# Patient Record
Sex: MALE | Race: WHITE
[De-identification: names, ages, dates, MRNs, and addresses within clinical notes are randomized per-mention and may not be internally consistent; named-entity substitution may affect disease eponyms.]

---

## 2022-12-09 NOTE — NUR
RN NOTE



TELEPHONE CALL FROM JANEEN OF [facility], VERIFIED IF FAMILY STILL 
WANTS TO BE TRANSFERRED, ADVISED HER YES PER NEERU, PT'S DAUGHTER. JANEEN SAID TRANSFER MIGHT 
NOT HAPPEN TONIGHT, BUT OBTAINED PRIMARY MD'S CONTACT FOR PEER TO PEER.

## 2022-12-09 NOTE — NUR
RT NOTE

LATE ENTRY

Pt rec'd trached on [facility] vent on AC mode settings given from fire dept. Pt shows no signs of 
resp distress or sob. Pt sx'd for thick mod amt of pale yellow secretions. Alarms are set 
and audible. Ambu bag at bedside. Vent plugged into red outlet.

-------------------------------------------------------------------------------

Addendum: 12/09/22 at 0622 by ADAN HAWKINS RT

-------------------------------------------------------------------------------

Amended: Links added.

## 2022-12-09 NOTE — NUR
ADMISSION RN NOTES



PATIENT RECEIVED VIA GURNEY FROM ED, ALERT BUT NON VERBAL, CAN ONLY UNDERSTAND Citizen of [place]. ON 
VENT SETTING, TOLERATING WELL, FAMILY AT THE BEDSIDE TO TRANSLATE. DENIES ANY PAIN AT THIS 
MOMENT, NOTED WHEN CLEANING THE PATIENT, PATIENT TRIED PULLING OUT TRACH AND GT, ORDERED 
BILATERAL WRIST SOFT RESTRAINT, EXPLAINED TO THE FAMILY AT THE BEDSIDE, CONFIRMED 
UNDERSTANDING. GT NOTED PATENT AND INTACT. NOTED LEFT WRIST NOTED PATENT AND INTACT, FLUSHES 
WELL. MIDLINE INSERTED AT YUVAL, OK TO USED PER MIDLINE NURSE, FLUSHES WELL. DUE LASIX GIVEN.  
NOTED BLOOD SUGAR 247 MG/DL. NOTED WITH F/C PATENT AND INTACT, DRAINING CLEAR YELLOW URINE. 
NOTED OPEN WOUND ON PENIS AREA, SACRUM, UPPER BACK AND LEFT LOWER LEG, WOUND CARE CONSULT 
PLACED, COVERED WITH MEPILEX. NOTED WITH BLE +2 PITTING EDEMA AND SCROTAL EDEMA. ORIENTED TO 
THE USE OF CALL LIGHT. BED IN LOWEST POSITION. SAFETY MEASURES IN PLACED. ENDORSED TO THAO JOSEPH FOR ISAMAR.

## 2022-12-09 NOTE — NUR
NILESH FROM [facility] C/O HYPERGLYCEMIA BS  TAKEN 20 MINS AGO. PT 
AWAKE AND RESPONSIVE. TRACH DEPENDANT; RT AT PT'S BEDSIDE. VENT SETTINGS AC VT: 
FIO2 35, VT 45, PEEP +5. GTUBE PEG INTACT. F/C DRAINING URINE. CONNECTED PT TO 
POX AND MONITOR. SAFETY MEASURES IN PLACE.

## 2022-12-09 NOTE — NUR
RT AT BEDSIDE; INFORMED RN THAT PT'S RESP RATE IS AT 35-40. DR KELLEY MADE AWARE 
W/ ORDER OF ATIVAN 1MG, ORDER IS READ BACK AND VERIFIED.

## 2022-12-09 NOTE — NUR
YUNI FROM [facility] [phone] HAVE NO BED AVAILABLE BUT 
WILL CONTACT US WHEN ONE IS READY.

## 2022-12-09 NOTE — NUR
RN NOTE



PATIENT IN BED, AO X 2-3, NON VERBAL, UNDERSTANDS [place] ONLY, FAMILY AT BEDSIDE. PT IN NO 
ACUTE DISTRESS, ON TRACH TO MECHANICAL VENT WITH PRESCRIBED SETTINGS, SATURATION AT 97%, SR 
ON THE MONITOR, HR IS 97. YUVAL MIDLINE PATENT AND FLUSHING WELL. GTUBE IN PLACE, CLAMPED, 
BARCENAS CATHETER DRAINING TO A CLEAR, YELLOW OUTPUT. SAFETY MEASURES IN PLACE, BED IS LOCKED 
AND AT LOWEST POSITION, HOB ELEVATED, CALL LIGHT WITHIN REACH OF PATIENT. WILL CONT TO 
MONITOR AND REASSESS.

## 2022-12-10 NOTE — NUR
RN NOTE



PATIENT IN BED, AO X 2-3, NON VERBAL, UNDERSTANDS Gambian ONLY, FAMILY AT BEDSIDE. PT IN NO 
ACUTE DISTRESS, ON TRACH TO MECHANICAL VENT WITH PRESCRIBED SETTINGS, SATURATION %, SR 
ON THE MONITOR, HR IS 95. YUVAL MIDLINE PATENT AND FLUSHING WELL. GTUBE IN PLACE, NO RESIDUAL, 
RESUMED TUBE FEEDING OF GLUCERNA AT 35 ML/HR. BARCENAS CATHETER DRAINING TO A CLEAR, YELLOW 
OUTPUT. SAFETY MEASURES IN PLACE, BED IS LOCKED AND AT LOWEST POSITION, HOB ELEVATED, CALL 
LIGHT WITHIN REACH OF PATIENT. WILL CONT TO MONITOR AND REASSESS.

## 2022-12-10 NOTE — NUR
RN NOTE



FAMILY REQUESTING FOR MEDS TO CALM PATIENT DOWN AND HELP HIM SLEEP AT NIGHT AS PATIENT GETS 
RESTLESS AND TRIES TO PULL OUT TRACH AND GTUBE. DR DE LEON NOTIFIED, ORDER RECEIVED FOR SEROQUEL 
25 MG HS.

## 2022-12-11 NOTE — NUR
RN NOTE



PATIENT IN BED, AO X 2-3, NON VERBAL, UNDERSTANDS [place] . PT IN NO ACUTE DISTRESS, ON 
TRACH TO MECHANICAL VENT WITH PRESCRIBED SETTINGS, SATURATION %, SR ON THE MONITOR, HR 
IS 95. YUVAL MIDLINE PATENT AND FLUSHING WELL. GTUBE IN PLACE, NO RESIDUAL, RESUMED TUBE 
FEEDING OF GLUCERNA AT 35 ML/HR. BARCENAS CATHETER DRAINING TO A CLEAR, YELLOW OUTPUT. SAFETY 
MEASURES IN PLACE, BED IS LOCKED AND AT LOWEST POSITION, HOB ELEVATED, CALL LIGHT WITHIN 
REACH OF PATIENT. WILL CONT TO MONITOR AND REASSESS.

## 2022-12-11 NOTE — NUR
TELE RN OPENING NOTE

PT RECEIVED IN BED, AWAKE, NON-VERBAL, BUT ABLE TO MOUTH WORDS; MAINLY [place]-SPEAKING. PT 
ON TRACH WITH SHILEY #8, AC 20, , FIO2 35%, PEEP 5; WITH CURRENT O2SAT %; NO S/S 
OF RESP DISTRESS, NO SOB OR COUGH, NON-LABORED AND EQUAL BREATHING. PT ATTACHED TO EXTERNAL 
MONITOR, SR WITH HR OF 83. BARCENAS INTACT AND PATENT, DRAINING CLEAR AND YELLOW URINE. PT 
NOTED TO HAVE BILATERAL SOFT WRIST RESTRAINTS; NO SIGNS OF IMPAIRED SKIN OR CIRCULATION; 
WILL PROVIDE PT WITH RELEASE OF RESTRAINTS AND HYGIENE. YUVAL MIDLINE INTACT AND PATENT, 
FLUSHES EASILY WITH NO RESISTANCE, NO MEDS/FLUIDS INFUSING THROUGH IT. BED IN LOWEST 
POSITION, CALL LIGHT WITHIN REACH, SIDE RAILS UP X3. WILL CONTINUE TO MONITOR THROUGHOUT THE 
NIGHT.

## 2022-12-11 NOTE — NUR
RN NOTE



PATIENT IN BED, AO X 2-3, NON VERBAL,  . PT IN NO ACUTE DISTRESS, ON TRACH TO MECHANICAL 
VENT WITH PRESCRIBED SETTINGS, SATURATION %, SR ON THE MONITOR, HR IS 95. YUVAL MIDLINE 
PATENT AND FLUSHING WELL. GTUBE IN PLACE, NO RESIDUAL, TUBE FEEDING HELD AT 1700. BARCENAS 
CATHETER DRAINING TO A CLEAR, YELLOW OUTPUT. SAFETY MEASURES IN PLACE, BED IS LOCKED AND AT 
LOWEST POSITION, HOB ELEVATED, CALL LIGHT WITHIN REACH OF PATIENT.ENDORSED TO NIGHT SHIFT RN 
FOR ISAMAR

## 2022-12-12 NOTE — NUR
RN NOTE



PATIENT IN BED, AO X 2-3, MOUTH WORD,  . PT IN NO ACUTE DISTRESS, ON TRACH TO MECHANICAL 
VENT WITH PRESCRIBED SETTINGS, SATURATION %, SR ON THE MONITOR, YUVAL MIDLINE PATENT AND 
FLUSHING WELL. GTUBE IN PLACE, NO RESIDUAL, GT GLUCERNA RUNNING 55 ML/HR CONTINUOUS. BARCENAS 
CATHETER DRAINING TO A CLEAR, YELLOW OUTPUT. SAFETY MEASURES IN PLACE, BED IS LOCKED AND AT 
LOWEST POSITION, HOB ELEVATED, CALL LIGHT WITHIN REACH OF PATIENT.CONTINUE BILATERAL SOFT 
RESTRAIN, SKIN AND CIRCULATION CHECKED WITHIN NORMAL. ENDORSED TO NIGHT SHIFT RN FOR ISAMAR

## 2022-12-12 NOTE — NUR
WOUND CARE CONSULT: PT PRESENTS WITH MULTIPLE WOUNDS INCLUDING PENIS, RT GROIN CLOSED 
INCISION WITH SOME WEEPING, SACRAL AND BACK UNSTAGEABLE PRESSURE ULCERS AND LEFT LOWER LEG 
DRY WOUND, ALL PRESENT ON ADMISSION.DR ARAGON CALLED FOR SURGICAL CONSULT. 
RECOMMENDATIONS MADE FOR SKIN PROTECTION AND DISCUSSED WITH NURSING STAFF. MD IN AGREEMENT 
WITH PLAN OF CARE.

## 2022-12-12 NOTE — NUR
TELE RN CLOSING NOTE

PT REMAINS IN BED, ASLEEP BUT EASILY AROUSABLE, NON-VERBAL, BUT ABLE TO MOUTH WORDS AND NOD 
HEAD TO YES OR NO QUESTIONS; MAINLY Greenlandic-SPEAKING. PT REMAINS ON SAME VENT SETTINGS; 
TOLERATED VENT SETTINGS WELL WITH O2SAT RANGING FROM 97%- 100%; NO S/S OF RESP DISTRESS, NO 
SOB OR COUGH, NON-LABORED AND EQUAL BREATHING. PT ATTACHED TO EXTERNAL MONITOR, SR WITH HR 
RANGING FROM 81- 83. BARCENAS INTACT AND PATENT, DRAINING CLEAR AND YELLOW URINE. BILATERAL 
SOFT WRIST RESTRAINTS REMAIN IN PLACE; NO SIGNS OF IMPAIRED SKIN OR CIRCULATION; PROVIDED PT 
WITH RELEASE OF RESTRAINTS AND HYGIENE. YUVAL MIDLINE INTACT AND PATENT, FLUSHES EASILY WITH 
NO RESISTANCE, NO MEDS/FLUIDS INFUSING THROUGH IT. ALL DUE MEDS ADMINISTERED DURING THE 
NIGHT. BED IN LOWEST POSITION, CALL LIGHT WITHIN REACH, SIDE RAILS UP X3. WILL ENDORSE TO 
DAYSHIFT NURSE TO CONTINUE CARE.

## 2022-12-12 NOTE — NUR
TELE RN OPENING NOTE

PT RECEIVED IN BED, AWAKE, NON-VERBAL, BUT ABLE TO MOUTH WORDS; MAINLY French-SPEAKING. PT 
ON TRACH WITH SHILEY #8, AC 20, , FIO2 35%, PEEP 5; WITH CURRENT O2SAT %; NO S/S 
OF RESP DISTRESS, NO SOB OR COUGH, NON-LABORED AND EQUAL BREATHING. PT ATTACHED TO EXTERNAL 
MONITOR, SR WITH HR OF 77 . BARCENAS INTACT AND PATENT, DRAINING CLEAR AND YELLOW URINE. PT 
NOTED TO HAVE BILATERAL SOFT WRIST RESTRAINTS; NO SIGNS OF IMPAIRED SKIN OR CIRCULATION; 
WILL PROVIDE PT WITH RELEASE OF RESTRAINTS AND HYGIENE. YUVAL MIDLINE INTACT AND PATENT, 
FLUSHES EASILY WITH NO RESISTANCE,. BED IN LOWEST POSITION, CALL LIGHT WITHIN REACH, SIDE 
RAILS UP X3. WILL CONTINUE TO MONITOR THROUGHOUT

## 2022-12-12 NOTE — NUR
RN TELE OPEN NOTE: ALERT AND ORIENTED TO NAME. REORIENTED TO TIME AND PLACE. MOUTHS WORDS. 
TRACH WITH VENT AT PRESCRIBED SETTINGS. GT IN PLACE PATENT. LEFT UPPER ARM MIDLINE IN PLACE. 
NO S/S OF COMPLICATIONS. ON TELE MONITOR WITH A READING OF SINUS RHYTHM.  BILATERAL SOFT 
WRIST RESTRAINTS IN PLACE SKIN IS WITHIN NORMAL, CIRCULATION WITHIN NORMAL. WOUNDS WITH 
CLEAN DRESSINGS ON. NO S/S OF COMPLICATIONS.  HOB ELEVATED SEMI FOWLERS POSITION, BILATERAL 
HALF SIDE RAILS UPX2. BED IS LOCKED, IN LOW POSITION, EXIT ALARM ON. CALL LIGHT IN REACH.

## 2022-12-13 NOTE — NUR
RN NOTE 



INFORMED DR ASENCIO REGARDING PLATELET COUNT 23. RECEIVED ORDER FOR 1 PLATELET TRANSFUSION. 
ORDERS PLACED

## 2022-12-13 NOTE — NUR
RN OPENING NOTE: 



ALERT AND ORIENTED TO NAME. REORIENTED TO TIME AND PLACE. MOUTHS WORDS. TRACH WITH VENT AT 
PRESCRIBED SETTINGS. GT IN PLACE PATENT. LEFT UPPER ARM MIDLINE IN PLACE. NO S/S OF 
COMPLICATIONS. ON TELE MONITOR.  BILATERAL SOFT WRIST RESTRAINTS IN PLACE SKIN IS WITHIN 
NORMAL, CIRCULATION WITHIN NORMAL. WOUNDS WITH CLEAN DRESSINGS ON. NO S/S OF COMPLICATIONS.  
HOB ELEVATED SEMI FOWLERS POSITION, BILATERAL HALF SIDE RAILS UPX2. BED IS LOCKED, IN LOW 
POSITION, EXIT ALARM ON.

## 2022-12-13 NOTE — NUR
RN TELE CLOSING NOTE: ALERT AND ORIENTED TO NAME. REORIENTED TO TIME AND PLACE. MOUTHS 
WORDS. TRACH WITH VENT AT PRESCRIBED SETTINGS. GT IN PLACE PATENT. LEFT UPPER ARM MIDLINE IN 
PLACE. NO S/S OF COMPLICATIONS. ON TELE MONITOR WITH A READING OF SINUS RHYTHM.  BILATERAL 
SOFT WRIST RESTRAINTS IN PLACE SKIN IS WITHIN NORMAL, CIRCULATION WITHIN NORMAL. WOUNDS WITH 
CLEAN DRESSINGS ON. NO S/S OF COMPLICATIONS.  HOB ELEVATED SEMI FOWLERS POSITION, BILATERAL 
HALF SIDE RAILS UPX2. BED IS LOCKED, IN LOW POSITION, EXIT ALARM ON. CALL LIGHT IN REACH. 
DENIES PAIN OR DISCOMFORT.

## 2022-12-13 NOTE — NUR
RN NOTE 



RECEIVED CRITICAL 2.8 POTASSIUM INFORMED DR OCHOA RECEIVED ORDER FOR 20MEQ POTASSIUM IV. 
ORDERS PLACED

## 2022-12-13 NOTE — NUR
TELE RN OPENING NOTE: 



RECEIVED PT ALERT AND ORIENTED X1. MOUTHS WORDS. TRACH WITH VENT AT PRESCRIBED SETTINGS. GT 
IN PLACE PATENT. LEFT UPPER ARM MIDLINE IN PLACE. NO S/S OF COMPLICATIONS. ON TELE MONITOR.  
BILATERAL SOFT WRIST RESTRAINTS IN PLACE SKIN IS WITHIN NORMAL, CIRCULATION WITHIN NORMAL. 
WOUNDS WITH CLEAN DRESSINGS ON. NO S/S OF COMPLICATIONS.  HOB ELEVATED SEMI FOWLERS 
POSITION, BILATERAL HALF SIDE RAILS UPX2. BED IS LOCKED, IN LOW POSITION, BED ALARM ON. WILL 
CONTINUE TO MONITOR THROUGHOUT THE SHIFT.

## 2022-12-13 NOTE — NUR
RN TELE CLOSING NOTE:

 

ALERT AND ORIENTED TO NAME. MOUTHS WORDS. TRACH WITH VENT AT PRESCRIBED SETTINGS. GT IN 
PLACE PATENT. LEFT UPPER ARM MIDLINE IN PLACE. NO S/S OF COMPLICATIONS. ON TELE MONITOR WITH 
A READING OF SINUS RHYTHM.  BILATERAL SOFT WRIST RESTRAINTS IN PLACE SKIN IS WITHIN NORMAL, 
CIRCULATION WITHIN NORMAL. WOUNDS WITH CLEAN DRESSINGS ON. NO S/S OF COMPLICATIONS.  HOB 
ELEVATED SEMI FOWLERS POSITION, BILATERAL HALF SIDE RAILS UPX2. BED IS LOCKED, IN LOW 
POSITION, EXIT ALARM ON. CALL LIGHT IN REACH. DENIES PAIN OR DISCOMFORT.

## 2022-12-14 NOTE — NUR
TELE RN OPENING NOTE: 



RECEIVED PT IN BED, ALERT AND ORIENTED TO NAME. REORIENTED TO TIME AND PLACE. MOUTHS WORDS. 
TRACH WITH VENT AT PRESCRIBED SETTINGS, TOLERATING WELL.  GT IN PLACE, CLAMPED. WILL RESUME 
AT 2100.  LEFT UPPER ARM MIDLINE IN PLACE. PATENT AND INTACT, RUNNING NS TKO.  NO S/SX OF 
COMPLICATIONS. ON TELE MONITOR READS SR WITH HR.  BILATERAL SOFT WRIST RESTRAINTS IN PLACE, 
SKIN IS WITHIN NORMAL, CIRCULATION WITHIN NORMAL. ALL SAFETY MEASURES IN PLACE: HOB ELEVATED 
SEMI FOWLERS POSITION, BILATERAL HALF SIDE RAILS UPX2 WITH EXTRA PADDING IN PLACE. BED IS 
LOCKED, IN LOW POSITION, EXIT ALARM ON. CALL LIGHT WITHIN REACH. WILL CONTINUE TO MONITOR.

## 2022-12-14 NOTE — NUR
TELE RN CLOSING NOTE: 



PT AWAKE, ALERT AND ORIENTED X1. MOUTHS WORDS. TRACH WITH VENT AT PRESCRIBED SETTINGS. GT IN 
PLACE, HOLD FOR NOW DUE TO NPO STATUS FOR SURGERY. LEFT UPPER ARM MIDLINE IN PLACE INTACT 
AND PATENT WITH NS AT TKO. NO S/S OF COMPLICATIONS. ON TELE MONITOR READING SR AT 82.  
BILATERAL SOFT WRIST RESTRAINTS IN PLACE SKIN IS WITHIN NORMAL, CIRCULATION WITHIN NORMAL. 
WOUNDS WITH CLEAN DRESSINGS ON. NO S/S OF COMPLICATIONS.  HOB ELEVATED SEMI FOWLERS 
POSITION, BILATERAL HALF SIDE RAILS UPX2. BED IS LOCKED, IN LOW POSITION, BED ALARM ON. WILL 
ENDORSE TO AM SHIFT NURSE FOR CONTINUITY OF CARE.

## 2022-12-15 NOTE — NUR
TELE RN CLOSING NOTES



ALERT AND ORIENTED TO NAME. REORIENTED TO TIME AND PLACE. MOUTHS WORDS. TRACH WITH VENT AT 
PRESCRIBED SETTINGS, NO SOB NOTED, NOT IN DISTESS, NO FACIAL GRIMACING NOTED. GT IN PLACE 
PATENT AND INTACT, TURN OFF GT FEEDING AT 1700H.  LEFT UPPER ARM MIDLINE IN PLACE, PATENT 
AND INTACT, FLUSHES WELL, WITH ON GOING POTASSIUM SUPPLEMENTATION. ON TELE MONITOR WITH SR 
83.   BILATERAL SOFT WRIST RESTRAINTS IN PLACE SKIN IS WITHIN NORMAL, CIRCULATION WITHIN 
NORMAL. WOUNDS WITH CLEAN DRESSINGS ON. NO S/S OF COMPLICATIONS.  HOB ELEVATED SEMI FOWLERS 
POSITION, BILATERAL HALF SIDE RAILS UPX2.  S/P THORACENTESIS ON RIGHT PLEURAL SPACE, 
DRESSING NOTED C/D/I NO BLEEDING NOTED, NO S/SX OF INFECTION NOTED. BED IS LOCKED, IN LOW 
POSITION, EXIT ALARM ON.CALL LIGHT WITHIN REACH. WILL ENDORSE TO NIGHT NURSE FOR ISAMAR.

## 2022-12-15 NOTE — NUR
TELE RN NOTES



RECEIVED NEW ORDER FROM MD WEBSTER TODAY AND BMP ON 12/16/22, NOTED AND CARRIED OUT. POTASSIUM 
LEVEL TODAY 3.2, PHARMACY NOTIFIED. PLAN ON GOING.

## 2022-12-15 NOTE — NUR
TELE RN NOTES



DR. WILDER CALLED AND INQUIRING ABOUT THE THORACENTESIS WHY IT WAS NOT DONE, NOTED PLAVIX WAS 
LAST GIVEN ON 12/13/22 @0900H, ULTRASOUND TECH MADE AWARE. PER ULTRASOUND TECH PLAVIX SHOULD 
BE HELD FOR 5 DAYS, SHE WILL INFORMED THE DOCTOR. INFORMED DR. WILDER, PER DR. WILDER HE WILL 
TAKE THE RESPONSIBILITY FOR TAPPING THE PATIENT TODAY, PER ULTRASOUND TECH THEY NEED A 
WRITTEN DRSandra NOTES, DR. WILDER MADE AWARE. PLAN OF CARE CONTINUED.

## 2022-12-15 NOTE — NUR
TELE RN OPENING NOTES



ALERT AND ORIENTED TO NAME. REORIENTED TO TIME AND PLACE. MOUTHS WORDS. TRACH WITH VENT AT 
PRESCRIBED SETTINGS, NO SOB NOTED, NOT IN DISTESS, NO FACIAL GRIMACING NOTED. GT IN PLACE 
PATENT ON GLUCERNA 1.2 @ 55ML/HR, TOLERATING WELL, NO N/V/D NOTED. LEFT UPPER ARM MIDLINE IN 
PLACE, PATENT AND INTACT, FLUSHES WELL. ON TELE MONITOR WITH SR 70.  BILATERAL SOFT WRIST 
RESTRAINTS IN PLACE SKIN IS WITHIN NORMAL, CIRCULATION WITHIN NORMAL. WOUNDS WITH CLEAN 
DRESSINGS ON. NO S/S OF COMPLICATIONS.  HOB ELEVATED SEMI FOWLERS POSITION, BILATERAL HALF 
SIDE RAILS UPX2. BED IS LOCKED, IN LOW POSITION, EXIT ALARM ON.CALL LIGHT WITHIN REACH. PLAN 
OF CARE ON GOING.

## 2022-12-15 NOTE — NUR
RN OPENING NOTE



PT AWAKE AND ALERT. NONVERBAL BUT MAKES GESTURES. RESPIRATIONS EVEN AND UNLABORED ON VENT 
WITH O2 SAT %. SR ON CARDIAC MONITOR. BILATERAL SOFT RESTRAINTS IN PLACE. YUVAL MIDLINE 
INTACT AND RUNNING 10 MEQ OF K. TUBE FEEDING PAUSED TILL 2100. NO ACUTE SIGNS OF DISTRESS. 
SAFETY PRECAUTIONS IN PLACE. BED LOCKED AND AT LOWEST LEVEL. X2 RAILS UP AND BED ALARM ON. 
CALL LIGHT WITHIN REACH.

## 2022-12-15 NOTE — NUR
TELE RN NOTES



PATIENT HAD THORACENTESIS ON RIGHT PLEURAL SPACE, NOTED 1050ML PLEURAL FLUID REMOVED YELLOW 
JHON URINE, FAMILY AT THE BEDSIDE, INFORMED PATIENT HAD THORACENTESIS. NO BLEEDING NOTED AT 
THE SITE, VS T 98.O P= 89 RR 20 /71 02 SAT 98%, NO SHORTNESS OF BREATH NOTED. PLAN OF 
CARE CONTINUED.

## 2022-12-16 NOTE — NUR
RN CLOSING NOTE: 



PT ALERT AND ORIENTED TO NAME. REORIENTED TO TIME AND PLACE. MOUTHS WORDS. TRACH WITH VENT 
AT PRESCRIBED SETTINGS. GT IN PLACE PATENT. NO RESIDUAL VOLUME NOTED. GTUBE INTACT, PATENT. 
LEFT UPPER ARM MIDLINE IN PLACE. IV PATENT, FLUSHING WELL, INTACT. NO S/S OF COMPLICATIONS. 
ON TELE MONITOR CURRENTLY SR 66. BARCENAS CATHETHER YELLOW COLOR DRAINING TO GRAVITY. PT ON 
BILATERAL SOFT WRIST RESTRAINTS IN PLACE.NO SKIN OR CIRCULATION ISSUES NOTED AT THIS TIME. 
HOB ELEVATED AT ALL TIMES. SIDE RAILS UP X2. BED LOCKED AND IN LOWEST POSITION. BED ALARM 
ON. ENDORSED TO NIGHT SHIFT RN FOR CONTINUITY OF CARE

## 2022-12-16 NOTE — NUR
RN OPENING NOTE: 



PT ALERT AND ORIENTED TO NAME. REORIENTED TO TIME AND PLACE. MOUTHS WORDS. TRACH WITH VENT 
AT PRESCRIBED SETTINGS. GT IN PLACE PATENT. NO RESIDUAL VOLUME NOTED. LEFT UPPER ARM MIDLINE 
IN PLACE. NO S/S OF COMPLICATIONS. ON TELE MONITOR CURRENTLY SR 80. PT ON BILATERAL SOFT 
WRIST RESTRAINTS IN PLACE.NO SKIN OR CIRCULATION ISSUES NOTED AT THIS TIME. HOB ELEVATED 
SEMI FOWLERS POSITION. SIDE RAILS UP X2. BED LOCKED AND IN LOWEST POSITION. BED ALARM ON.

## 2022-12-16 NOTE — NUR
rn note



called radiology for follow up on stat chest x ray results of left lung thoracentesis said 
results should be ready in 15-20 minutes

## 2022-12-16 NOTE — NUR
RN CLOSING NOTE



PT AWAKE AND ALERT. PT IS IRRITABLE AND REFUSED LAB DRAW THIS AM. RESPIRATIONS EVEN AND 
UNLABORED ON VENT WITH O2 SAT %. SR ON CARDIAC MONITOR. BILATERAL SOFT RESTRAINTS IN 
PLACE. YUVAL MIDLINE INTACT. TUBE FEEDING RUNNING AT 55 ML/HR X 24 HRS. NO ACUTE SIGNS OF 
DISTRESS. SAFETY PRECAUTIONS IN PLACE. BED LOCKED AND AT LOWEST LEVEL. X2 RAILS UP AND BED 
ALARM ON. CALL LIGHT WITHIN REACH.

## 2022-12-16 NOTE — NUR
RN NOTES:

-NOTED WITH FACIAL GRIMACE DURING REPOSITIONING, TYLENOL prn FOR PAIN GIVEN

-CONSUMED GLUCERNA 1.5 AT 55 ML/HR STARTED NEW BOTTLE

## 2022-12-16 NOTE — NUR
tele  rn note 

 thoracentesis lt lung done , 900 ml removed per dr herrera no need sent specimen  for 
cytology

## 2022-12-16 NOTE — NUR
RN NOTES:

RECEIVED AWAKE ON BED, A/OX1, LOOKS RESTLESS HE IS MOVING UP AND DOWN, WHILE ENDORSEMENT WE 
TRIED TO REPOSITION HIM, WITH SOFT RESTRAINT ON BILATERAL HANDS, TELE MONITOR SR-80'S, NPO, 
WITH G-TUBE FEEDING GLUCERNA AT 55 ML/HR CONTINUOS.ON MECHANICAL VENTILATOR, BAHMANLEY #8. 
AC-20  TV-40  FiO2-35 PEEP-5 SPO2-1005, ON BARCENAS CATH DRAINING INTO YELLOWISH COLORED URINE 
 CC LEVEL,ORIENTED TO UNIT AND STAFF, NO SIGN OF RESPIRATORY DISTRESS, NON LABORED 
BREATHING, AFETY AND ASPIRATION PRECAUTION OBSERVED.

## 2022-12-16 NOTE — NUR
rn note 



pt took out inner cannula and very anxious. notified np jacqueline that seroquel was given as 
scheduled and if pt can be ordered anxiety medications. 



np jacqueline increased dose of seroquel to 50 mg and on restraints. orders noted and carried 
out

## 2022-12-17 NOTE — NUR
RN CLOSING NOTES

PATIENT QUET IN BED, WIFE AT BEDSIDE, IV SITE FLUSHES WELL, ALL MEDS GIVEN THROUGI 
TUBE,TOLERATED WELL. SAFETY MEASURES MAINTAINED, BED IN LOWEST POSITION. ENDORSE TO THE 
NIGHT SHIFT.

## 2022-12-17 NOTE — NUR
RN NOTES:

AWAKE, REPOSITIONED, SOFT RESTRAIN ON BUE CHECKED AT FREQUENT INTERVAL, CIRCULATION CHECKED, 
NO SKIN ISSUES NOTED,NO DISCOLORATION ON THE RESTRAINT SITE, ABLE TO UNDERSTAND INSTRUCTION 
ENCOURAGE, NARINDER CLIFTON IN SITE -OUTPUT-350, HAD 1 BM, G-TUBE FEEDING ONGOING, ASPIRATION 
PRECAUTION OBSERVED.FOR LABS IN THE MORNING, F/U CARDIO AND PULMO CONSULT, ENDORSED FOR 
CONTINUITY OF CARE

## 2022-12-17 NOTE — NUR
TELE RN NOTES:

RECEIVED AWAKE ON BED, A/OX1, LOOKS RESTLESS HE IS MOVING UP AND DOWN, SOFT RESTRAINT ON 
BILATERAL HANDS, TELE MONITOR SR-80'S, NPO, WITH G-TUBE FEEDING GLUCERNA AT 55 ML/HR 
CONTINUOS.ON MECHANICAL VENTILATOR, MARIO #8. AC-20  TV-40  FiO2-35 PEEP-5 SPO2-1005, ON 
BARCENAS CATH DRAINING INTO YELLOWISH COLORED URINE  CC LEVEL,ORIENTED TO UNIT AND STAFF, 
NO SIGN OF RESPIRATORY DISTRESS, NON LABORED BREATHING, AFETY AND ASPIRATION PRECAUTION 
OBSERVED. PATIENTS BED IN LOWEST POSITION AND LOCKED 

-------------------------------------------------------------------------------

Addendum: 12/17/22 at 1801 by IRENE WEST RN

-------------------------------------------------------------------------------

ADDENDUM:



WITH SH 8 TRACH TO MECHANICAL VENTILATOR SETTING AS ORDERED, WELL TOLERATED. BREATHING EVEN 
AND UNLABORED, MOUTHS WORDS, SEE NURSING FLOWSHEET FOR SKIN ISSUES, BOTH WRIST RESTRAINTS 
RELEASED AND CHECKED FOR CIRCULATION THEN Q 2 HOURS. 

PATIENT SEEN BY SPEECH THERAPIST AND RECOMMENDED TO REMAIN NPO DUE TO SOB WHILE EATING.

GT CHECKED FOR PLACEMENT 0 RESIDUAL.

## 2022-12-17 NOTE — NUR
PATIENT RECEIVED ON TRACH TO VENT WITH SETTINGS OF AC 20, 450 Vt, 35%, +5. SUCTIONED FOR 
MINIMAL, THIN, WHITE SECRETIONS. GIVEN IN-LINE TREATMENTS WITH NO ADVERSE REACTIONS. AMBU 
BAG AT BEDSIDE. VENT AND PULSE OXIMETER ALARMS AUDIBLE AND VISIBLE. VENT PLUGGED INTO RED 
OUTLET.

-------------------------------------------------------------------------------

Addendum: 12/17/22 at 0558 by HERMINIO JONES RT

-------------------------------------------------------------------------------

Amended: Links added.

## 2022-12-17 NOTE — NUR
on weaning trial per dr. sharon ARMSTRONG 4

PS 15

FIO2 35%

PEEP +5



RN NOTIFIED ON ABOVE CHANGES FOR WEANING TRIAL.

-------------------------------------------------------------------------------

Addendum: 12/17/22 at 0853 by CHIP ADKINS RT

-------------------------------------------------------------------------------

Amended: Links added.

## 2022-12-17 NOTE — NUR
RN NOTES:

BLOOD SUGAR-216, INSULIN GIVEN PER SCALE, WILL CONTINUE TO MONITOR FOR ANY SIGN OF 
HYPER/HYPOGLYCEMIA, FEEDING ONGOING GLUCERNA 1.2 AT 55 ML/HR.

## 2022-12-17 NOTE — NUR
RN NOTES:

BLOOD SUGAR CHECK-164, INSULIN GIVEN PER SCALE,HE IS FULLY AWAKE, TURNING SIDE TO SIDE, 
UNABLE TO MAINTAIN SEMI FOWLERS POSITION HE IS SLIDING DOWN, KEPT ON FREQUENT VISUAL CHECK.

## 2022-12-18 NOTE — NUR
RN NOTE

PTs SACRUM, LEFT UPPER BACK AND GROIN AREAS CLEAN WITH NS AND PAT DRY, THERAHONEY APPLIED TO 
OPEN AREAS, SACRUM WOUND PACK WITH IDOFORM PACKING, COVERED WITH OPTIFOAM.

## 2022-12-18 NOTE — NUR
TELE RN OPENING NOTES



PATIENT IN BED, A/O X 2-3,ABLE TO MOUTH WORDS TO MAKE NEEDS KNOWN.PT TRACH TO MECHANICAL 
VENT,TOLERATING SETTING, BUT DESATS WHEN RESTLESS. TELE MONITOR READING SR, IV ACCESS YUVAL 
M/L, INTACT AND PATENT,G TUBE RUNNING GLUCERNA 1.5 @55 ML/HR ONGOING, CHECKED FOR PLACEMENT, 
NO RESIDUAL NOTED, BARCENAS CATHETER DRAINING CLEAR YELLOW URINE, SKIN ISSUES DOCUMENTED AND 
PICS IN CHART, PT ON ROB SOFT WRIST RESTRAINTS, RESTRAINTS WILL BE VISUALLY CHECKED FOR 
CIRCULATION THEN Q 15MI.  ALL FALL AND SAFETY MEASURES IN PLACE, BED ALARM ON, BED IN LOW 
AND LOCK POSITION, CALL LIGHT AND TABLE WITHIN EASY REACH, SIDE RAILS UP X2. WILL CONTINUE 
TO MONITOR.

## 2022-12-18 NOTE — NUR
RN CLOSING NOTE

PATIENT IN SUPINE POSITION, IV SITE FLUSHES WELL, GTUBE FLUSHED, SOFT RESTRAINS IN USE. 
SAFETY PRECAUTIONS IMPLEMENTED, BED IN LOWEST POSITION. IMPLEMENTED.

## 2022-12-18 NOTE — NUR
TELE RN AM NOTES



PATIENT IN BED, AAO X 2-3, MOUTH WORDS, 



WITH SHILEY 8.5 TRACH TO MECHANICAL VENT, SETTING AS ORDERED, AC 20  FIO2 35  PEEP 5. 
O2 SAT 99%. BREATHING EVEN AND UNLABORED, WELL TOLERATED. NO SOB, NO DISTRESS, SR HR 81 ON 
MONITOR, NO SIGNS OF PAIN, WITH YUVAL MIDLINE, FLUSHES WELL SITE CLEAR.  G TUBE GLUCERNA 1.5 
55 ML/HR ONGOING, CHECKED FOR PLACEMENT, O RESIDUAL. BARCENAS CATH IN PLACE, DRAINING TO YELLOW 
COLORED URINE.SEE NURSING FLOWSHEET FOR SKIN ISSUES, BOTH WRIST RESTRAINTS RELEASED AND 
CHECKED FOR CIRCULATION THEN Q 2 HOURS. SAFETY MEASURES IN PLACE. BED LOW/LOCKED. HOB 3O 
DEG, SR UP X 2, CALL LIGHT WITHIN REACH. WILL CONTINUE TO MONITOR.

PATIENT SEEN BY SPEECH THERAPIST AND RECOMMENDED TO REMAIN NPO DUE TO SOB WHILE EATING.

## 2022-12-19 NOTE — NUR
RN CLOSING NOTE

ALL SIGNIFICANT CHANGES THROUGHOUT SHIFT WAS DOCUMENTED. PT STABLE WILL ENDORSE TO MORNING 
SHIFT FOR ISAMAR.

## 2022-12-19 NOTE — NUR
TELE RN OPENING NOTES



PATIENT IN BED, A/O X 2-3,ABLE TO MOUTH WORDS TO MAKE NEEDS KNOWN.PT TRACH TO MECHANICAL 
VENT,TOLERATING SETTING, BUT DESATS WHEN RESTLESS. TELE MONITOR READING SR, IV ACCESS YUVAL 
M/L, INTACT AND PATENT,G TUBE RUNNING GLUCERNA 1.5 @55 ML/HR ONGOING, CHECKED FOR PLACEMENT, 
NO RESIDUAL NOTED, BARCENAS CATHETER DRAINING CLEAR YELLOW URINE, SKIN ISSUES DOCUMENTED AND 
PICS IN CHART, PT ON ROB SOFT WRIST RESTRAINTS, RESTRAINTS WILL BE VISUALLY CHECKED FOR 
CIRCULATION THEN Q 15MI. BP WAS LOW 70/35 RECHECKED MANUALLY AND WAS 85/40 AND TEMP 101.2 NP 
TANIA GARCIA WAS ADVISED AND ORDERS PLACED. ALL FALL AND SAFETY MEASURES IN PLACE, BED ALARM 
ON, BED IN LOW AND LOCK POSITION, CALL LIGHT AND TABLE WITHIN EASY REACH, SIDE RAILS UP X2. 
WILL CONTINUE TO MONITOR.

## 2022-12-19 NOTE — NUR
RN NOTE

PT BP WAS 72/46, MANUAL CHECK BP WAS 82/62, NP RADHA NOTIFIED AND ADVISED TO JUST HOLD LASIX 
IF  SBP <90 AND MONITOR, NO NEW ORDERS FOR MEDICATION.

## 2022-12-19 NOTE — NUR
RN CLOSING NOTES

PATIENT QUET IN BED, IV SITE FLUSHES WELL, ALL MEDS GIVEN THROUGH G TUBE,TOLERATED WELL. 
SAFETY MEASURES MAINTAINED, BED IN LOWEST POSITION. ENDORSE TO THE NIGHT SHIFT.

## 2022-12-19 NOTE — NUR
RN NOTE

PT BP WAS 70/35, MANUALLY CHECKED AND BP WAS 85/40. TEMP .2 TYLENOL WAS GIVEN. NP 
RADHA WAS NOTIFIED OF BP. ORDER FOR A BOLUS 500ML AND MIDODRINE.

## 2022-12-19 NOTE — NUR
RN NOTES:



WHILE DOING TRACH CARE NOTED PT IS VERY WARM TO TOUCH CHECKED TEMP 103 TYLENOL GIVEN, 
COOLING MEASURES RENDERED, SAVANAH CUEVAS NP MADE AWARE

## 2022-12-19 NOTE — NUR
TELE RN OPENING NOTES:

PATIENT IN BED, A&O X 2-3, MOUTH WORDS, WITH SHILEY 8.5 TRACH TO MECHANICAL VENT, SETTING AS 
ORDERED, AC 20  FIO2 35  PEEP 5. O2 %. BREATHING EVEN AND UNLABORED, WELL 
TOLERATED. NO SOB, NO DISTRESS, SR HR 79 ON MONITOR, NO SIGNS OF PAIN, WITH YUVAL MIDLINE, 
FLUSHES WELL SITE CLEAR.  G TUBE GLUCERNA 1.5 @55 ML/HR ONGOING, CHECKED FOR PLACEMENT, O 
RESIDUAL. BARCENAS CATH IN PLACE, DRAINING TO YELLOW COLORED URINE. BOTH WRIST RESTRAINTS 
RELEASED AND CHECKED FOR CIRCULATION THEN Q 2 HOURS. SAFETY MEASURES IN PLACE. BED 
LOW/LOCKED. HOB 3O DEG, SR UP X 2, CALL LIGHT WITHIN REACH. WILL CONTINUE TO MONITOR.

## 2022-12-20 NOTE — NUR
RN NOTES



Resident comfortably resting in bed. No pain/discomfort at this time. Pt on vent, current 
settings well tolerated. Call light within easy reach.

## 2022-12-20 NOTE — NUR
RN CLOSING NOTES





Resident remains in stable condition. Able to make needs known. YUVAL midline intact, dry. No 
c/o pain or discomfort. Currentt settings well tolerated. No sob, no acute distress. No s/s 
of delayed post transfusion reaction noted at this time. FC in placed, secured and patent. 
GT in placed, patent and secured. Current GTF Glucerna well colt. No N/V/D. All needs 
attended.

## 2022-12-20 NOTE — NUR
RN NOTES



Resident received pRBC, well toleated. No evidence of fluid overload, no s/s of transfusion 
reaction. V/S WNL. Post transfusion CBC ordered from MD. Awaiting for results.

## 2022-12-20 NOTE — NUR
RN CLOSING NOTE

ALL SIGNIFICANT CHANGES THROUGHOUT SHIFT WAS DOCUMENTED. PT STABLE RESTING IN BED. CONSENT 
WAS SIGNED AND PLACED IN THE CHART FOR TRANSFUSION. WILL ENDORSE TO MORNING SHIFT FOR ISAMAR.

## 2022-12-20 NOTE — NUR
RN OPENING NOTES;





 Received Patient in bed awaked aox2 with confusion.on Vent pt colt well,no sign sob/distress 
noted,no complain of pain /discomfort at this time,IV access on YUVAL midline intact and 
patend,FC in placed,GTube Glucerna @55ml/hr colt well,no residual noted,safety measure in 
place,call light within reach,will continue to monitor.

## 2022-12-21 NOTE — NUR
LVN NOTES



RECEIVED PT AWAKE IN BED AOX2. NO COMPLAINTS OF PAIN OR DISCOMFORT AT THIS TIME. 
RESPIRATIONS ARE EQUAL AND UNLABORED WITH NO SOB. PT IS ON A VENTILATOR AND TOLERATING IT 
WELL. TRACH IS PATENT, INTACT AND IN MIDLINE POSITION. GT IS PATENT AND INTACT. IV ACCESS  
ON YUVAL MIDLINE. HOB ELEVATED TO 30-45 DEGREES. SIDERAILS UP AT ALL TIME. BED LOCKED AND ON 
ITS LOWEST SETTING. WILL ANTICIPATE NEEDS.

## 2022-12-21 NOTE — NUR
LVN CLOSING NOTES



ALL DUE MEDS AND TX GIVEN AS ORDERED. PT TOLERATED EVERYTHING WELL. CURRENTLY ON COOL 
AEROSOL 6L ON 28% FIO2 TOLERATING IT WELL WITH O2 SATURATION AT 99%. IV ACCESS ON YUVAL ML 
PATENT AND INTACT. GT PATENT AND INTACT WITH GTF RUNNING AT PRESCRIBED RATE. HOB ELEVATED TO 
30-45 DEGREES. SIDERAILS UP AT ALL TIMES. WILL ENDORSE TO ONCOMING NURSE.

## 2022-12-21 NOTE — NUR
RN CLOSING NOTES;





 Patient in bed awaked aox2 with confusion.Communicate by hand,On Vent pt colt well,no sign 
sob/distress noted,no complain of pain /discomfort during shift,Due meds given as order,All 
needs attended,IV access on YUVAL midline intact and patend,FC draining missy urine output 
900ml,GTube Glucerna @55ml/hr colt well,no residual noted,safety measure in place,call light 
within reach,will endorsed to next shift.

## 2022-12-21 NOTE — NUR
TELE RN OPENING NOTES:

PATIENT IN BED, A&O X 2,ABLE TO  MOUTH WORDS NEEDS  V/S STABLE AFEBRILE WITH PORTEX 8.5 COOL 
AEROSOL SETINGS ,  5 LITERS O2 28% O2 SAT 97%. NO SOB NO DISTRESS NOTED BREATHING EVEN AND 
UNLABORED,SR HR 79 ON MONITOR, NO SIGNS OF PAIN, WITH YUVAL MIDLINE, FLUSHES WELL SITE CLEAR.  
G TUBE GLUCERNA 1.5 @55 ML/HR ONGOING, CHECKED FOR PLACEMENT, O RESIDUAL. BARCENAS CATH IN 
PLACE, DRAINING TO YELLOW COLORED URINE. BOTH WRIST RESTRAINTS RELEASED AND CHECKED FOR 
CIRCULATION THEN Q 2 HOURS. SAFETY MEASURES IN PLACE. BED LOW/LOCKED. HOB 3O DEG, SR UP X 2, 
CALL LIGHT WITHIN REACH. WILL CONTINUE TO MONITOR.

-------------------------------------------------------------------------------

Addendum: 12/22/22 at 0118 by CHIKIS MORRIS RN

-------------------------------------------------------------------------------

PTS ON SHIIWONA #8 NOT PORTEX

## 2022-12-21 NOTE — NUR
LVN NOTES:



ABG RESULTS RELAYED TO DR WILDER WITH ORDERS TO KEEP THE PT OFF THE VENTILATOR. NOTED AND 
CARRIED OUT

## 2022-12-22 NOTE — NUR
RN TELE CLOSING NOTES



 PTS  REMAIN ON COOL AEROSOL 5 LITERS   WELL  TOLERATED . ON 28% FIO2  IV ACCESS ON YUVAL ML 
PATENT AND INTACT. GT FEEDING PATENT AND INTACT  NO RESIDUAL  NOTED . HOB ELEVATED TO 30-45 
DEGREES. SIDERAILS UP AT ALL TIMES. WILL ENDORSE TO ONCOMING NURSE.REMAINS  ON BILATERAL 
RESTRAINT.

## 2022-12-22 NOTE — NUR
RT



pt received on CA 28%. Fred lynn 8. trach patent and secure. ambu bag at bedside. spare 
trach at bedside. no sob, no resp distress. scant secretions suctioned via trach. lung sound 
clear throughout.

## 2022-12-22 NOTE — NUR
RN NOTES



PATIENT TO BE DISCHARGED TO [facility] PER MD IN STABLE CONDITION. PROVIDED DC 
INSTRUCTIONS, MED CON LIST AND HEALTH TEACHINGS, PATIENT TO FOLLOW UP WITH PCP  PER FACILITY 
PROTOCOL. IV ACCESS TO BE REMOVED, PHOTOS OF SKIN ISSUES TAKEN AND INCORPORATED IN PATIENT'S 
CHART. GTUBE PATENT INTACT FLUSHED. BARCENAS CATH IN PLACE, PATENT INTACT. NO BELONGINGS. ALL 
PAPER WORKS SIGNED BY DAUGHTER. AMBULANCE  AT 1400. 



REPORT GIVEN TO [facility] FACILITY STAFF.

## 2022-12-22 NOTE — NUR
TRACH CUFFED FULLY DEFLATED

-------------------------------------------------------------------------------

Addendum: 12/22/22 at 0835 by CHIP ADKINS RT

-------------------------------------------------------------------------------

Amended: Links added.

## 2022-12-22 NOTE — NUR
RN NOTES



ATTEMPTED TO GIVE REPORT TO [facility], PER STAFF, THEY ARE STILL BUSY WITH ADMISSION 
PAPERS AND STUFF. I LEFT THEM LUKAS NUMBER TO CALL US FOR REPORT



NANETTE GOODWIN, CASE MGT. NOTIFIED

## 2022-12-22 NOTE — NUR
tele rn notes 

blood sugar at 12mn is 238mg/dl  4 units if regiular insulin given per sliding scale  pts on 
gt feeding.

## 2022-12-22 NOTE — NUR
LVN OPENING NOTES



RECEIVED PT AWAKE IN BED AOX2. NO COMPLAINTS OF PAIN OR DISCOMFORT AT THIS TIME. 
RESPIRATIONS ARE EQUAL AND UNLABORED WITH NO SOB. PT IS CURRENTLY ON COOL AEROSOL 5L WITH 
FIO2 AT 28% AND TOLERATING IT WELL. GT IS PATENT AND INTACT. IV ACCESS ON YUVAL MIDLINE PATENT 
AND INTACT. HOB ELEVATED TO 30-45 DEGREES. SIDERAILS UP AT ALL TIMES. BED LOCKED AND AT ITS 
LOWEST SETTING. CALL LIGHT WITHIN REACH. WILL CONTINUE TO MONITOR.

## 2022-12-22 NOTE — NUR
RN NOTES



PATIENT PICKED UP BY [facility] TO TRANSPORT PATIENT TO FACILITY. STABLE CONDITION.

IV ACCESS ON ON LEFT UPPER ARM REMOVED, CATH TIP COMPLETE, PRESSURE AND DRESSING APPLIED. NO 
BLEEDING.

## 2022-12-25 NOTE — NUR
[id number] FOR UNWITNESSED GLF 30 MINS PTA. UNKNOWN LOC. PATIENT IS AAOX4. CAME 
WITH TRACHE ATTACHED TO TPIECE AT 3LPM. PLACED COMFORTABLY IN BED. VITALS 
CHECKED.

## 2023-01-02 NOTE — NUR
RN NOTES





PATIENT REMAINS STABLE NO SIGNIFICANT CHANGES IN HEALTH CONDITION. ALL DUE MEDS GIVEN AS 
ORDERED. WILL ENDORSED TO MORNING SHIFT FOR ISAMAR 02-Jan-2023 20:26

## 2023-01-24 NOTE — NUR
CALLED DR. AMAYA (PCP) REGARDING IF HE WANTED TO ADMIT THE PT OR EPIC TO 
ADMIT AWAITING CALL BACK FROM PCP

## 2023-01-24 NOTE — NUR
PT. TRANSFERRED FROM ER TO . PT USED SAME VENT SAME SETTINGS.

VENT PLUGGED INTO RED OUTLET WITH AMBU BAG @ BEDSIDE

-------------------------------------------------------------------------------

Addendum: 01/24/23 at 1438 by CHIP ADKINS RT

-------------------------------------------------------------------------------

Amended: Links added.

## 2023-01-24 NOTE — NUR
PT PLACED IN BED AND MONITOR. MD AT BEDSIDE. IV ACCESS ESTEBLISHED 20G LEFT AC 
AND 18G RIGHT FOREARM. BLOOD DRAWN AND SENT TO LAB.

## 2023-01-24 NOTE — NUR
ICU Closing Note: 7A shift

Admit to ICU ;Sepsis

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

Presented  more altered this am. Acute metabolic encephalopathy

CXR shows: Moderate right and small left pleural effusions with atelectatic changes of

the lung bases.  Findings are on a background of moderate vascular congestion.

Moderate right and small left pleural effusions.



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



H/H stable

Corrected Hyperkalemia 6.7

DANTE, Likely ATN

Hyponnatremia, likely hypervolemic

Dysphagia with PEG

Functional quadriplegia 



Hx Thoracentesis



Follow Up Admitting orders

## 2023-01-24 NOTE — NUR
BIB RA [facility] FROM SNF FOR BEING MORE ALTERED THAN NORMAL LKW THIS MORNING. 1 DOSE 
OF ATROPINE GIVEN ON ROUTE.

## 2023-01-24 NOTE — NUR
VENT CHANGES BELOW AS ORDER:

AC 12

VT 400ML

FIO2 30%

PEEP +5



RN NOTIFIED

-------------------------------------------------------------------------------

Addendum: 01/24/23 at 1216 by CHIP ADKINS RT

-------------------------------------------------------------------------------

Amended: Links added.

## 2023-01-24 NOTE — NUR
ALTERED PT. ADMITTED IN ER BED 5 PLACED INTO MECHANICAL VENT VIA TRACH.

EMERGENCY TRACH CHANGED AS ORDER FROM UNCUFFED TO CUFFED WITH SAME SIZE FOR VENTILATION.

VENT SETTINGS BELOW AS ORDERED:

AC 14

VT 500ML

FIO2 100%

PEEP +10

BREATH SOUNDS CLEAR BILATERAL POST TRACH CHANGED, SXN NO SECRETIONS AT ALL.

VENT PLUGGED INTO RED OUTLET WITH ALARMS ON AND FUNCTIONING

BVM AND SPARE TRACH @ BEDSIDE.

-------------------------------------------------------------------------------

Addendum: 01/24/23 at 1140 by CHIP ADKINS RT

-------------------------------------------------------------------------------

Amended: Links added.

## 2023-01-25 NOTE — NUR
LUKAS RN  OPENING noteS 



Received pts in bed  awake, alert and oriented. on 1 PRBC  blood transfusion on progress  no 
ase  noted  v/s stable  afebrile . on mechanical vent with ordered settings tolerating well. 
with right wrist  G#18  and left fa  G #20 intact and patent .pt has mcqueen catheter yellow 
color draining to gravity. no c/o  of pain or discomfort noted , no sob no distress noted   
pt on tele monitor currently sinus rhythm hr  89 . all safety measures in place. bed locked 
at lowest position. side rails up x2. bed alarm on. will continue to  monitor.

## 2023-01-25 NOTE — NUR
LUKAS RN NOTES 

PTS  NOTED EPISODE  PULLING IV TUBINGS  PULLING TRACH SPOKE TO DR CUEVAS  WITH ORDER 
BILATERAL SOFT WRIST RESTRAINT , ATIVAN PRN AS ORDERED . WILL CONTINUE TO MONITOR PTS.

## 2023-01-25 NOTE — NUR
rn note



received telephone consent from Jaden patient's daughter [phone] for blood 
transfusion for hgb 6.4. co witnessed by Hernesto Mooney RN. pt daughter agreed and verbalized 
understanding. consent signed and placed in chart

## 2023-01-25 NOTE — NUR
icu rn opening note



pt is awake, alert and oriented. pt is on mechanical vent with ordered settings tolerating 
well. pt able to nod to simple questions. pt has mcqueen cathether yellow color draining to 
gravity. pt has right iv and left iv. iv intact patent, flushing well. no pain or discomfort 
noted at this time. pt on tele monitor currently sinus rhytm. all safety measures in place. 
bed locked at lowest position. side rails on up x2. bed alarm on.

## 2023-01-25 NOTE — NUR
rn note



notified dr. roca that hgb 6.4 pending blood transfusion. and if ok to hold plavix for 
now. said ok to hold. orders noted and carried out

## 2023-01-25 NOTE — NUR
WOUND CARE CONSULT: PT PRESENTS WITH STAGE 4 PRESSURE ULCER TO SACRUM, UPPER BACK 
DISCOLORATION/SCARRING AND DRY ESCHAR TO LEFT LOWER LEG, ALL PRESENT ON ADMISSION. DR ARAGON AND DR CARVALHO CALLED FOR SURGICAL AND DPM CONSULTS. DISCUSSED SKIN PROTECTION 
WITH NURSING STAFF. PT DEMONSTRATES ABILITY TO ASSIST WITH TURNING AND REPOSITIONING IN BED. 
MD IN AGREEMENT WITH PLAN OF CARE. 

-------------------------------------------------------------------------------

Addendum: 01/25/23 at 1104 by YANET ASHTON WNDNU

-------------------------------------------------------------------------------

Amended: Links added.

## 2023-01-25 NOTE — NUR
tele rn closing note 



pt is awake, alert and oriented. pt is on mechanical vent with ordered settings tolerating 
well. pt has mcqueen cathether yellow color draining to gravity. pt has right iv and left iv. 
iv intact patent, flushing well. no pain or discomfort noted at this time. pt on tele 
monitor currently sinus rhytm 81. all safety measures in place. bed locked at lowest 
position. side rails up x2. bed alarm on. pt currently ongoing blood transfusion. endorsed 
to night shift rn. no adverse reactions noted at this time.

## 2023-01-25 NOTE — NUR
rn note 



blood transfusion continues increased to 150 ml/hr. no adverse reactions noted. vital signs 
stable

## 2023-01-25 NOTE — NUR
LUKAS JOSEPH NOTES

JEVITY FEEDING INCREASED TO 30CC/HR. PT TOLERATING WELL. NO RESIDUAL NOTED.

-------------------------------------------------------------------------------

Addendum: 01/26/23 at 0208 by CHIKIS MORRIS RN

-------------------------------------------------------------------------------

FEEDING IS GLUCERNA, NOT JEVITY.

## 2023-01-25 NOTE — NUR
rn note



blood transfusion continues increased to 120 ml/hr. no adverse reactions noted. pt 
tolerating well. vital signs within normal limits

## 2023-01-26 NOTE — NUR
TELE RN  OPENING noteS 



Received pts in bed  awake, alert and oriented.  v/s stable  afebrile . on mechanical vent 
with ordered settings tolerating well. with right wrist  G#18  and left fa  G #20 intact and 
patent pts on bilateral soft wrist restraint  to prevent pts  from pulling  invassive  
tubing  all due  meds  given as ordered  pts on gt feeding  glucerna at 55cc/hr  tolerating 
well ,all needs attended too call light within reach  .pt has mcqueen catheter yellow color 
draining to gravity. no c/o  of pain or discomfort noted , no sob no distress noted   pt on 
tele monitor currently sinus rhythm hr76 . all safety measures in place. bed locked at 
lowest position. side rails up x2. bed alarm on. will continue to  monitor.

## 2023-01-26 NOTE — NUR
LUKAS RN NOTES

BLOOD SUGAR AT 0000HRS IS 120MG/DL  NOINSULIN COVERAGE  GIVEN  PER SLIDING SCALR   LANTUS 18 
UNITS GIVEN AS ORDERED.

## 2023-01-26 NOTE — NUR
LUKAS RN  OPENING NOTE 

Received pts in bed sleeping ,  on mechanical vent with ordered settings tolerating well. 
with right wrist  G#18  and left fa  G #20 intact and patent .pt has mcqueen catheter yellow 
color draining to gravity. no c/o  of signs of  or discomfort noted , no sob no distress 
noted   pt on tele monitor currently sinus rhythm hr  75 .. all safety measures in place. 
bed locked at lowest position. side rails up x2. bed alarm on. will continue to  monitor.

## 2023-01-26 NOTE — NUR
LUKAS RN NOTES

.BLOOD SUGAR FOR 0600 ID 112MG/DL  NO COVERAGE GIVEN  PER SLIDING SCALE

CARDIZEM FOR6AM DOSE  NOT  GIVEN D/T BLOODPRESSURE  99/43

## 2023-01-26 NOTE — NUR
tele rn notes

Blood sugar at 12mn is 198 mg/dl lantus 18 units  given as ordered  ans 3 units ofregular 
insulin given per sliding scale

## 2023-01-26 NOTE — NUR
LUKAS RN NOTES  

PTS  REMAIN IN  BED  REMAIN ON VENTILATOR  ON SAME  SETTING  WELL TOLERATED  BY PTS.  
CONTINUE ON  BILATERAL WRIST  RESTRAINT  AS ORDERED  WILL ENDORSE TO RN DAY SHIFT  FOR 
CONTINUITY OF CARE.

## 2023-01-27 NOTE — NUR
rn note



notified  that hgb on 1/25 6.4, 1/26 7.4 and current hgb 7.3. and if ok to hold 
plavix.

md said to hold.order noted and carried out

## 2023-01-27 NOTE — NUR
rn note



pt daughter concern that hgb 7.3 and wanted discharge to held. notified dr. roca. dr. roca spoke with pt daughter and said okay to be discharge

## 2023-01-27 NOTE — NUR
OPAL NOTE



gave report to Gail JOSEPH.

-------------------------------------------------------------------------------

Addendum: 01/27/23 at 1133 by KAMINI COX RN

-------------------------------------------------------------------------------

at [facility]) [phone]  30Z

## 2023-01-27 NOTE — NUR
rn discharge note



pt discharged. reviewed discharge paperwork with patients vinnie. verbalized 
understanding. removed tele monitor box. kept IV due to patient receiving Merrem for 4 days. 
notified [facility] facility that transport team checked bp and it was lo 93/52 rechecked again 
113/97. made Aralyce aware at [facility] and dr. roca was notified. still okay to be 
discharge. pt stable condition. patient has no belongings.

## 2023-01-27 NOTE — NUR
tele  rn opening note



pt is awake, alert and oriented. pt is on mechanical vent with ordered settings tolerating 
well. pt able to nod to simple questions. pt has mcqueen cathether yellow color draining to 
gravity. pt has right iv and left iv. iv intact patent, flushing well. no pain or discomfort 
noted at this time. pt on tele monitor currently sinus rhytm 70.pt on soft bilateral wrist 
rrestraints. no skin or circulation issues noted at this time. all safety measures in place. 
bed locked at lowest position. side rails on up x2. bed alarm on.

## 2023-01-27 NOTE — NUR
TELE RN NOTES  

PTS  REMAIN IN  BED  REMAIN ON VENTILATOR  ON SAME  SETTING  WELL TOLERATED  BY PTS.  
CONTINUE ON  BILATERAL WRIST  RESTRAINT  AS ORDERED  FOR  SERIAL DEBRIDEMENT SACRAL  TODAY  
CONSENTED FOR  PROCEDURE .WILL ENDORSE TO RN DAY SHIFT  FOR CONTINUITY OF CARE.

## 2023-02-07 ENCOUNTER — HOSPITAL ENCOUNTER (INPATIENT)
Dept: HOSPITAL 54 - ER | Age: 68
LOS: 8 days | Discharge: NURSING, REHAB OR LONG-TERM CARE | DRG: 710 | End: 2023-02-15
Attending: INTERNAL MEDICINE | Admitting: INTERNAL MEDICINE
Payer: COMMERCIAL

## 2023-02-07 VITALS — HEIGHT: 67 IN | WEIGHT: 110 LBS | BODY MASS INDEX: 17.27 KG/M2

## 2023-02-07 DIAGNOSIS — Z93.1: ICD-10-CM

## 2023-02-07 DIAGNOSIS — Z79.82: ICD-10-CM

## 2023-02-07 DIAGNOSIS — E87.4: ICD-10-CM

## 2023-02-07 DIAGNOSIS — I25.10: ICD-10-CM

## 2023-02-07 DIAGNOSIS — E87.1: ICD-10-CM

## 2023-02-07 DIAGNOSIS — J90: ICD-10-CM

## 2023-02-07 DIAGNOSIS — N39.0: ICD-10-CM
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
## 2023-02-07 DIAGNOSIS — N18.9: ICD-10-CM

## 2023-02-07 DIAGNOSIS — Z99.11: ICD-10-CM

## 2023-02-07 DIAGNOSIS — L90.5: ICD-10-CM

## 2023-02-07 DIAGNOSIS — Z93.0: ICD-10-CM

## 2023-02-07 DIAGNOSIS — T17.990A: ICD-10-CM

## 2023-02-07 DIAGNOSIS — Z79.84: ICD-10-CM

## 2023-02-07 DIAGNOSIS — E88.09: ICD-10-CM

## 2023-02-07 DIAGNOSIS — J93.9: ICD-10-CM

## 2023-02-07 DIAGNOSIS — A41.9: Primary | ICD-10-CM
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
## 2023-02-07 DIAGNOSIS — I50.32: ICD-10-CM

## 2023-02-07 DIAGNOSIS — R13.10: ICD-10-CM

## 2023-02-07 DIAGNOSIS — N17.0: ICD-10-CM

## 2023-02-07 DIAGNOSIS — J96.10: ICD-10-CM

## 2023-02-07 DIAGNOSIS — L89.154: ICD-10-CM

## 2023-02-07 DIAGNOSIS — I13.0: ICD-10-CM

## 2023-02-07 DIAGNOSIS — Z79.4: ICD-10-CM

## 2023-02-07 DIAGNOSIS — Z95.5: ICD-10-CM

## 2023-02-07 DIAGNOSIS — E11.22: ICD-10-CM

## 2023-02-07 DIAGNOSIS — E44.1: ICD-10-CM

## 2023-02-07 DIAGNOSIS — D64.9: ICD-10-CM

## 2023-02-07 DIAGNOSIS — Z95.1: ICD-10-CM
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
## 2023-02-07 LAB
BASOPHILS # BLD AUTO: 0.1 K/UL (ref 0–0.2)
BASOPHILS NFR BLD AUTO: 0.5 % (ref 0–2)
BUN SERPL-MCNC: 62 MG/DL (ref 7–18)
CALCIUM SERPL-MCNC: 10.6 MG/DL (ref 8.5–10.1)
CHLORIDE SERPL-SCNC: 93 MMOL/L (ref 98–107)
CO2 SERPL-SCNC: 30 MMOL/L (ref 21–32)
CREAT SERPL-MCNC: 1.8 MG/DL (ref 0.6–1.3)
EOSINOPHIL NFR BLD AUTO: 1.2 % (ref 0–6)
GLUCOSE SERPL-MCNC: 247 MG/DL (ref 74–106)
HCT VFR BLD AUTO: 33 % (ref 39–51)
HGB BLD-MCNC: 10.5 G/DL (ref 13.5–17.5)
LYMPHOCYTES NFR BLD AUTO: 0.7 K/UL (ref 0.8–4.8)
LYMPHOCYTES NFR BLD AUTO: 6.4 % (ref 20–44)
MCHC RBC AUTO-ENTMCNC: 32 G/DL (ref 31–36)
MCV RBC AUTO: 86 FL (ref 80–96)
MONOCYTES NFR BLD AUTO: 0.4 K/UL (ref 0.1–1.3)
MONOCYTES NFR BLD AUTO: 3.1 % (ref 2–12)
NEUTROPHILS # BLD AUTO: 10.2 K/UL (ref 1.8–8.9)
NEUTROPHILS NFR BLD AUTO: 88.8 % (ref 43–81)
PLATELET # BLD AUTO: 381 K/UL (ref 150–450)
POTASSIUM SERPL-SCNC: 4.8 MMOL/L (ref 3.5–5.1)
RBC # BLD AUTO: 3.82 MIL/UL (ref 4.5–6)
SODIUM SERPL-SCNC: 132 MMOL/L (ref 136–145)
WBC NRBC COR # BLD AUTO: 11.5 K/UL (ref 4.3–11)

## 2023-02-07 PROCEDURE — C9113 INJ PANTOPRAZOLE SODIUM, VIA: HCPCS

## 2023-02-07 PROCEDURE — A4623 TRACHEOSTOMY INNER CANNULA: HCPCS

## 2023-02-07 PROCEDURE — G0378 HOSPITAL OBSERVATION PER HR: HCPCS

## 2023-02-07 PROCEDURE — A6253 ABSORPT DRG > 48 SQ IN W/O B: HCPCS

## 2023-02-07 PROCEDURE — A6403 STERILE GAUZE>16 <= 48 SQ IN: HCPCS

## 2023-02-07 PROCEDURE — A7526 TRACHEOSTOMY TUBE COLLAR: HCPCS

## 2023-02-07 PROCEDURE — C9803 HOPD COVID-19 SPEC COLLECT: HCPCS

## 2023-02-07 NOTE — NUR
[id number] FROM [facility] FOR NAUSEA. PT A/OX2; 
NONVERBAL. TRACH SATTING 100% ON FIO2 35%. GTUBE INTACT. CONNECTED PT TO POX 
AND MONITOR.

## 2023-02-08 VITALS — SYSTOLIC BLOOD PRESSURE: 126 MMHG | DIASTOLIC BLOOD PRESSURE: 53 MMHG

## 2023-02-08 VITALS — SYSTOLIC BLOOD PRESSURE: 120 MMHG | DIASTOLIC BLOOD PRESSURE: 61 MMHG

## 2023-02-08 VITALS — DIASTOLIC BLOOD PRESSURE: 57 MMHG | SYSTOLIC BLOOD PRESSURE: 124 MMHG

## 2023-02-08 VITALS — SYSTOLIC BLOOD PRESSURE: 129 MMHG | DIASTOLIC BLOOD PRESSURE: 64 MMHG

## 2023-02-08 LAB
ALBUMIN SERPL BCP-MCNC: 3.2 G/DL (ref 3.4–5)
ALP SERPL-CCNC: 131 U/L (ref 46–116)
ALT SERPL W P-5'-P-CCNC: 47 U/L (ref 12–78)
AST SERPL W P-5'-P-CCNC: 35 U/L (ref 15–37)
BILIRUB DIRECT SERPL-MCNC: 0.3 MG/DL (ref 0–0.2)
BILIRUB SERPL-MCNC: 0.5 MG/DL (ref 0.2–1)
BILIRUB UR QL STRIP: NEGATIVE
COLOR UR: YELLOW
GLUCOSE UR STRIP-MCNC: NEGATIVE MG/DL
LEUKOCYTE ESTERASE UR QL STRIP: (no result)
NITRITE UR QL STRIP: NEGATIVE
PH UR STRIP: 8.5 [PH] (ref 5–8)
PROT SERPL-MCNC: 9 G/DL (ref 6.4–8.2)
PROT UR QL STRIP: (no result) MG/DL
RBC #/AREA URNS HPF: (no result) /HPF (ref 0–2)
UROBILINOGEN UR STRIP-MCNC: 0.2 EU/DL

## 2023-02-08 PROCEDURE — 5A1955Z RESPIRATORY VENTILATION, GREATER THAN 96 CONSECUTIVE HOURS: ICD-10-PCS | Performed by: INTERNAL MEDICINE

## 2023-02-08 RX ADMIN — SODIUM HYPOCHLORITE SCH ML: 1.25 SOLUTION TOPICAL at 22:00

## 2023-02-08 RX ADMIN — INSULIN HUMAN PRN UNITS: 100 INJECTION, SOLUTION PARENTERAL at 17:54

## 2023-02-08 RX ADMIN — INSULIN GLARGINE SCH UNIT: 100 INJECTION, SOLUTION SUBCUTANEOUS at 22:07

## 2023-02-08 RX ADMIN — Medication SCH EACH: at 12:17

## 2023-02-08 RX ADMIN — ATORVASTATIN CALCIUM SCH MG: 40 TABLET, FILM COATED ORAL at 22:03

## 2023-02-08 RX ADMIN — INSULIN HUMAN PRN UNITS: 100 INJECTION, SOLUTION PARENTERAL at 13:16

## 2023-02-08 RX ADMIN — Medication SCH EACH: at 17:52

## 2023-02-08 RX ADMIN — ALBUTEROL SULFATE SCH MG: 2.5 SOLUTION RESPIRATORY (INHALATION) at 20:25

## 2023-02-08 RX ADMIN — Medication SCH EACH: at 08:30

## 2023-02-08 RX ADMIN — MEROPENEM SCH MLS/HR: 1 INJECTION INTRAVENOUS at 23:35

## 2023-02-08 RX ADMIN — MEROPENEM SCH MLS/HR: 1 INJECTION INTRAVENOUS at 12:17

## 2023-02-08 RX ADMIN — Medication SCH MG: at 20:25

## 2023-02-08 RX ADMIN — HEPARIN SODIUM SCH UNITS: 5000 INJECTION INTRAVENOUS; SUBCUTANEOUS at 21:00

## 2023-02-08 NOTE — NUR
TELE RN NOTE

RECEIVED REPORT FROM ER NURSE RAMIN. PT IS ADMITTED TO ROOM 324-1. PT IS A/O X2/3, NON 
VERBAL. VANCO IVPB ADMINISTERED TO PT. PT CLEANED, AND LEFT COMFORTABLE. WILL ENDORSE PT TO 
AM SHIFT NURSE FOR ADMISSION.

## 2023-02-08 NOTE — NUR
TELE RN OPENING NOTES



PATIENT AWAKE IN BED, A/O X 1-2, Korean SPEAKING, ON MECHANICAL VENT. BREATHING EVEN AND 
UNLABORED. NO DISTRESS OR SHORTNESS OF BREATH NOTED. IV ACCESS ON RAC 20G, PATENT AND 
INTACT. G-TUBE NOTED, DRY AND INTACT. ON BARCENAS CATHETER, DRAINING YELLOW URINE OUTPUT. 
SAFETY MEASURES GIVEN WITH BED ON LOWEST AND LOCKED POSITION. CALL LIGHT AND TRAY WITHIN 
REACH. WILL CONTINUE WITH THE PLAN OF CARE. 0 = understands/communicates without difficulty

## 2023-02-08 NOTE — NUR
TELE RN CLOSING NOTES



PATIENT AWAKE IN BED, A/O X 1-2, Portuguese SPEAKING, ON MECHANICAL VENT. BREATHING EVEN AND 
UNLABORED. NO DISTRESS OR SHORTNESS OF BREATH NOTED. ON TELE MONITOR READING SR 75. IV 
ACCESS ON RAC 20G, PATENT AND INTACT. WITH ONGOING G-TUBE FEEDING, TOLERATING WELL. ON BARCENAS 
CATHETER, DRAINING YELLOW URINE OUTPUT. ALL MEDS GIVEN. KEPT COMFORTABLE. SAFETY MEASURES 
GIVEN WITH BED ON LOWEST AND LOCKED POSITION. CALL LIGHT AND TRAY WITHIN REACH. ENDORSED TO 
NIGHT NURSE FOR ISAMAR.

## 2023-02-08 NOTE — NUR
CLEANED AND REPONSITED PT. F/C DRAINING URINE WELL. GTUBE INTACT. VSS. 

-------------------------------------------------------------------------------

Addendum: 02/08/23 at 0453 by SAVANNAH

-------------------------------------------------------------------------------

CLEANED AND REPOSITIONED PT. F/C DRAINING URINE WELL. GTUBE INTACT. VSS.

## 2023-02-08 NOTE — NUR
TELE RN OPENING NOTES:



RECEIVED PATIENT AWAKE IN BED, BED IN LOW POSITION CALL LIGHTS WITHIN REACH, NO COMPLAIN OF 
PAIN AND DISCOMFORT AT THIS TIME, ON MECHANICAL VENT SATURATING WELL, PATIENT IS A/OX 2-3 
ABLE TO MAKE NEEDS KNOWN, ON G TUBE FEEDING OF GLUCERNA  255ML/HR X 20 HOURS  INFUSING WELL, 
ON BARCENAS CATHETER-50CC URINE OUTPUT, ON TELE MONITOR-  SR-77, PATIENT KEPT CLEAN AND DRY ALL 
NEEDS MET WILL CONTINUE TO MONITOR.

## 2023-02-08 NOTE — NUR
PT TRANSFERRED TO 3W VIA ACLS PROTOCOL WITH RT. ENDORSED VANCOMYCIN FOR 
ADMINISTRATION TO [facility]. PT TOLERATED TRANSFER WELL. VSS.

## 2023-02-09 VITALS — SYSTOLIC BLOOD PRESSURE: 131 MMHG | DIASTOLIC BLOOD PRESSURE: 70 MMHG

## 2023-02-09 VITALS — SYSTOLIC BLOOD PRESSURE: 135 MMHG | DIASTOLIC BLOOD PRESSURE: 61 MMHG

## 2023-02-09 VITALS — DIASTOLIC BLOOD PRESSURE: 56 MMHG | SYSTOLIC BLOOD PRESSURE: 124 MMHG

## 2023-02-09 LAB
BASOPHILS # BLD AUTO: 0.1 K/UL (ref 0–0.2)
BASOPHILS NFR BLD AUTO: 1 % (ref 0–2)
BILIRUB UR QL STRIP: NEGATIVE
BUN SERPL-MCNC: 58 MG/DL (ref 7–18)
CALCIUM SERPL-MCNC: 9.9 MG/DL (ref 8.5–10.1)
CHLORIDE SERPL-SCNC: 102 MMOL/L (ref 98–107)
CO2 SERPL-SCNC: 33 MMOL/L (ref 21–32)
COLOR UR: YELLOW
CREAT SERPL-MCNC: 1.7 MG/DL (ref 0.6–1.3)
CREAT UR-MCNC: < 13 MG/DL (ref 30–125)
EOSINOPHIL NFR BLD AUTO: 3.6 % (ref 0–6)
GLUCOSE SERPL-MCNC: 150 MG/DL (ref 74–106)
GLUCOSE UR STRIP-MCNC: NEGATIVE MG/DL
HCT VFR BLD AUTO: 27 % (ref 39–51)
HGB BLD-MCNC: 8.8 G/DL (ref 13.5–17.5)
LEUKOCYTE ESTERASE UR QL STRIP: NEGATIVE
LYMPHOCYTES NFR BLD AUTO: 1.4 K/UL (ref 0.8–4.8)
LYMPHOCYTES NFR BLD AUTO: 18.9 % (ref 20–44)
MAGNESIUM SERPL-MCNC: 2.4 MG/DL (ref 1.8–2.4)
MCHC RBC AUTO-ENTMCNC: 33 G/DL (ref 31–36)
MCV RBC AUTO: 87 FL (ref 80–96)
MONOCYTES NFR BLD AUTO: 0.5 K/UL (ref 0.1–1.3)
MONOCYTES NFR BLD AUTO: 7.2 % (ref 2–12)
NEUTROPHILS # BLD AUTO: 5.1 K/UL (ref 1.8–8.9)
NEUTROPHILS NFR BLD AUTO: 69.3 % (ref 43–81)
NITRITE UR QL STRIP: NEGATIVE
PH UR STRIP: 7.5 [PH] (ref 5–8)
PHOSPHATE SERPL-MCNC: 4 MG/DL (ref 2.5–4.9)
PLATELET # BLD AUTO: 331 K/UL (ref 150–450)
POTASSIUM SERPL-SCNC: 4.3 MMOL/L (ref 3.5–5.1)
PROT UR QL STRIP: (no result) MG/DL
RBC # BLD AUTO: 3.07 MIL/UL (ref 4.5–6)
RBC #/AREA URNS HPF: (no result) /HPF (ref 0–2)
SODIUM SERPL-SCNC: 141 MMOL/L (ref 136–145)
SODIUM UR-SCNC: 123 MMOL/L (ref 40–220)
UROBILINOGEN UR STRIP-MCNC: 0.2 EU/DL
WBC #/AREA URNS HPF: (no result) /HPF (ref 0–3)
WBC NRBC COR # BLD AUTO: 7.4 K/UL (ref 4.3–11)

## 2023-02-09 PROCEDURE — 0KBN0ZZ EXCISION OF RIGHT HIP MUSCLE, OPEN APPROACH: ICD-10-PCS

## 2023-02-09 PROCEDURE — 0KBP0ZZ EXCISION OF LEFT HIP MUSCLE, OPEN APPROACH: ICD-10-PCS

## 2023-02-09 RX ADMIN — Medication SCH EACH: at 00:54

## 2023-02-09 RX ADMIN — ALBUTEROL SULFATE SCH MG: 2.5 SOLUTION RESPIRATORY (INHALATION) at 08:00

## 2023-02-09 RX ADMIN — SODIUM HYPOCHLORITE SCH ML: 1.25 SOLUTION TOPICAL at 09:00

## 2023-02-09 RX ADMIN — INSULIN HUMAN PRN UNITS: 100 INJECTION, SOLUTION PARENTERAL at 00:53

## 2023-02-09 RX ADMIN — HEPARIN SODIUM SCH UNITS: 5000 INJECTION INTRAVENOUS; SUBCUTANEOUS at 21:00

## 2023-02-09 RX ADMIN — MUPIROCIN SCH GM: 20 OINTMENT TOPICAL at 21:13

## 2023-02-09 RX ADMIN — TRAZODONE HYDROCHLORIDE SCH MG: 50 TABLET ORAL at 21:50

## 2023-02-09 RX ADMIN — Medication SCH EACH: at 06:00

## 2023-02-09 RX ADMIN — NICOTINE SCH MG: 21 PATCH TRANSDERMAL at 08:34

## 2023-02-09 RX ADMIN — ATORVASTATIN CALCIUM SCH MG: 40 TABLET, FILM COATED ORAL at 21:50

## 2023-02-09 RX ADMIN — FUROSEMIDE SCH MG: 40 TABLET ORAL at 08:33

## 2023-02-09 RX ADMIN — PANTOPRAZOLE SODIUM SCH MG: 40 GRANULE, DELAYED RELEASE ORAL at 08:42

## 2023-02-09 RX ADMIN — CHLORHEXIDINE GLUCONATE 0.12% ORAL RINSE SCH ML: 1.2 LIQUID ORAL at 08:33

## 2023-02-09 RX ADMIN — INSULIN HUMAN PRN UNITS: 100 INJECTION, SOLUTION PARENTERAL at 17:24

## 2023-02-09 RX ADMIN — ALBUTEROL SULFATE SCH MG: 2.5 SOLUTION RESPIRATORY (INHALATION) at 20:29

## 2023-02-09 RX ADMIN — Medication SCH MG: at 13:30

## 2023-02-09 RX ADMIN — FUROSEMIDE SCH MG: 40 TABLET ORAL at 16:40

## 2023-02-09 RX ADMIN — Medication SCH MG: at 08:00

## 2023-02-09 RX ADMIN — MEROPENEM SCH MLS/HR: 1 INJECTION INTRAVENOUS at 10:37

## 2023-02-09 RX ADMIN — AMIODARONE HYDROCHLORIDE SCH MG: 200 TABLET ORAL at 08:35

## 2023-02-09 RX ADMIN — CHLORHEXIDINE GLUCONATE 0.12% ORAL RINSE SCH ML: 1.2 LIQUID ORAL at 16:40

## 2023-02-09 RX ADMIN — SCOPOLAMINE SCH EA: 1 PATCH, EXTENDED RELEASE TRANSDERMAL at 14:19

## 2023-02-09 RX ADMIN — DEXTROSE MONOHYDRATE SCH MLS/HR: 50 INJECTION, SOLUTION INTRAVENOUS at 09:03

## 2023-02-09 RX ADMIN — FERROUS SULFATE TAB 325 MG (65 MG ELEMENTAL FE) SCH MG: 325 (65 FE) TAB at 08:34

## 2023-02-09 RX ADMIN — Medication SCH MG: at 01:35

## 2023-02-09 RX ADMIN — Medication SCH MG: at 20:29

## 2023-02-09 RX ADMIN — Medication SCH TAB: at 08:34

## 2023-02-09 RX ADMIN — DOCUSATE SODIUM SCH MG: 50 LIQUID ORAL at 08:33

## 2023-02-09 RX ADMIN — INSULIN HUMAN PRN UNITS: 100 INJECTION, SOLUTION PARENTERAL at 12:22

## 2023-02-09 RX ADMIN — OXYCODONE HYDROCHLORIDE AND ACETAMINOPHEN SCH MG: 500 TABLET ORAL at 08:33

## 2023-02-09 RX ADMIN — ASPIRIN 81 MG SCH MG: 81 TABLET ORAL at 08:34

## 2023-02-09 RX ADMIN — HEPARIN SODIUM SCH UNITS: 5000 INJECTION INTRAVENOUS; SUBCUTANEOUS at 08:38

## 2023-02-09 RX ADMIN — ALBUTEROL SULFATE SCH MG: 2.5 SOLUTION RESPIRATORY (INHALATION) at 13:30

## 2023-02-09 RX ADMIN — ACETYLCYSTEINE SCH MG: 100 INHALANT RESPIRATORY (INHALATION) at 08:00

## 2023-02-09 RX ADMIN — Medication SCH EACH: at 23:57

## 2023-02-09 RX ADMIN — Medication SCH EACH: at 17:11

## 2023-02-09 RX ADMIN — ALBUTEROL SULFATE SCH MG: 2.5 SOLUTION RESPIRATORY (INHALATION) at 01:35

## 2023-02-09 RX ADMIN — MEROPENEM SCH MLS/HR: 1 INJECTION INTRAVENOUS at 23:57

## 2023-02-09 RX ADMIN — DILTIAZEM HYDROCHLORIDE SCH MG: 30 TABLET, FILM COATED ORAL at 08:33

## 2023-02-09 RX ADMIN — ACETYLCYSTEINE SCH MG: 100 INHALANT RESPIRATORY (INHALATION) at 15:30

## 2023-02-09 RX ADMIN — ACETYLCYSTEINE SCH MG: 100 INHALANT RESPIRATORY (INHALATION) at 23:26

## 2023-02-09 RX ADMIN — INSULIN GLARGINE SCH UNIT: 100 INJECTION, SOLUTION SUBCUTANEOUS at 23:15

## 2023-02-09 RX ADMIN — Medication SCH EACH: at 12:16

## 2023-02-09 RX ADMIN — INSULIN HUMAN PRN UNITS: 100 INJECTION, SOLUTION PARENTERAL at 23:26

## 2023-02-09 NOTE — NUR
TELE RN CLOSING NOTES:



RECEIVED PATIENT AWAKE IN BED, BE DIN LOW POSITION, CALL LIGHTS WITHIN REACH, NO COMPLAIN OF 
PAIN AND DISCOMFORT AT THIS TIME, ON MECHANICAL VENT SATURATING WELL, PATIENT IS A/OX2-3 
Croatian SPEAKING ABLE TO EXPRESS NEEDS ON GTUBE  FEEDING HOB TO REMAIN UPRIGHT, ON TELE 
MONITOR- SR-77 NO SYMPTOMS WAS OBSERVED,  PATIENT KEPT CLEAN AND DRY ALL NEEDS MET ENDORSE 
TO INCOMING SHIFT.

## 2023-02-09 NOTE — NUR
TELE RN OPENING NOTES:



PATIENT AWAKE IN BED, ON MECHANICAL VENT SATURATING WELL, BREATHING EVEN AND UNLABORED. NO 
DISTRESS OR SHORTNESS OF BREATH NOTED. PATIENT IS A/OX 2-3 ABLE TO MAKE NEEDS KNOWN, ON G 
TUBE FEEDING OF GLUCERNA 55ML/HR X 20 HOURS  INFUSING WELL, ON BARCENAS CATHETER DRAINING WELL. 
ON TELE MONITOR-  SR-78,BED IN LOW POSITION CALL LIGHTS WITHIN REACH, NO COMPLAIN OF PAIN 
AND DISCOMFORT AT THIS TIME, WILL CONTINUE TO MONITOR.

## 2023-02-09 NOTE — NUR
TELE RN CLOSING NOTES



PATIENT AWAKE IN BED, ON MECHANICAL VENT SATURATING WELL, BREATHING EVEN AND UNLABORED. NO 
DISTRESS OR SHORTNESS OF BREATH NOTED. PATIENT IS A/OX 2-3 ABLE TO MAKE NEEDS KNOWN, ON G 
TUBE FEEDING OF GLUCERNA 55ML/HR INFUSING WELL, ON BARCENAS CATHETER DRAINING WELL WITH 800ML 
COLOR YELLOW URINE OUTPUT. ON TELE MONITOR-SR 77, ALL DUE MEDS GIVEN. BED IN LOW POSITION 
CALL LIGHTS WITHIN REACH, NO COMPLAIN OF PAIN AND DISCOMFORT AT THIS TIME, ENDORSED TO NEXT 
SHIFT NURSE FOR CONTINUING PATIENT CARE.

## 2023-02-09 NOTE — NUR
RN NOTES



PATIENT COMPLAINED OF HAVING DISCOMFORT IN BREATHING, NOTIFIED RT RIGHT AWAY, SUCTION 
SECRETION BY RT, NO FURTHER COMPLAINS THEREAFTER.

## 2023-02-09 NOTE — NUR
TELE RN OPENING NOTES



RECEIVED PATIENT AWAKE IN BED WATCHING TV. A/O X 2-3. ON MECHANICAL VENT SATURATING WELL.  
BREATHING EVEN AND UNLABORED. NO DISTRESS OR SHORTNESS OF BREATH NOTED. ON G TUBE FEEDING OF 
GLUCERNA 55ML/HR, TOLERATING WELL. ON BARCENAS CATHETER, DRAINING WELL WITH COLOR YELLOW URINE 
OUTPUT. ON TELE MONITOR-SR 78. NO C/O OF PAIN AND DISCOMFORT AT THIS TIME. SAFETY MEASURES 
MAINTAINED WITH BED ON LOWEST AND LOCKED POSITION. CALL LIGHT AND TRAY WITHIN EASY REACH. 
SIDE RAILS UP X 2. WILL CONTINUE WITH THE PLAN OF CARE.

-------------------------------------------------------------------------------

Addendum: 02/10/23 at 0736 by MILDRED SANCHEZ RN

-------------------------------------------------------------------------------

PATIENT ON TRACHEOSTOMY.

## 2023-02-10 VITALS — DIASTOLIC BLOOD PRESSURE: 50 MMHG | SYSTOLIC BLOOD PRESSURE: 100 MMHG

## 2023-02-10 VITALS — DIASTOLIC BLOOD PRESSURE: 58 MMHG | SYSTOLIC BLOOD PRESSURE: 116 MMHG

## 2023-02-10 VITALS — DIASTOLIC BLOOD PRESSURE: 72 MMHG | SYSTOLIC BLOOD PRESSURE: 110 MMHG

## 2023-02-10 VITALS — SYSTOLIC BLOOD PRESSURE: 128 MMHG | DIASTOLIC BLOOD PRESSURE: 64 MMHG

## 2023-02-10 LAB
BASOPHILS # BLD AUTO: 0.1 K/UL (ref 0–0.2)
BASOPHILS NFR BLD AUTO: 0.8 % (ref 0–2)
BUN SERPL-MCNC: 53 MG/DL (ref 7–18)
CALCIUM SERPL-MCNC: 9.9 MG/DL (ref 8.5–10.1)
CHLORIDE SERPL-SCNC: 103 MMOL/L (ref 98–107)
CO2 SERPL-SCNC: 34 MMOL/L (ref 21–32)
CREAT SERPL-MCNC: 1.5 MG/DL (ref 0.6–1.3)
EOSINOPHIL NFR BLD AUTO: 5.6 % (ref 0–6)
GLUCOSE SERPL-MCNC: 53 MG/DL (ref 74–106)
HCT VFR BLD AUTO: 25 % (ref 39–51)
HGB BLD-MCNC: 8.5 G/DL (ref 13.5–17.5)
LYMPHOCYTES NFR BLD AUTO: 1.2 K/UL (ref 0.8–4.8)
LYMPHOCYTES NFR BLD AUTO: 18.2 % (ref 20–44)
MCHC RBC AUTO-ENTMCNC: 33 G/DL (ref 31–36)
MCV RBC AUTO: 86 FL (ref 80–96)
MONOCYTES NFR BLD AUTO: 0.5 K/UL (ref 0.1–1.3)
MONOCYTES NFR BLD AUTO: 8 % (ref 2–12)
NEUTROPHILS # BLD AUTO: 4.4 K/UL (ref 1.8–8.9)
NEUTROPHILS NFR BLD AUTO: 67.4 % (ref 43–81)
PLATELET # BLD AUTO: 356 K/UL (ref 150–450)
POTASSIUM SERPL-SCNC: 3.8 MMOL/L (ref 3.5–5.1)
RBC # BLD AUTO: 2.96 MIL/UL (ref 4.5–6)
SODIUM SERPL-SCNC: 143 MMOL/L (ref 136–145)
WBC NRBC COR # BLD AUTO: 6.6 K/UL (ref 4.3–11)

## 2023-02-10 RX ADMIN — TRAZODONE HYDROCHLORIDE SCH MG: 50 TABLET ORAL at 22:27

## 2023-02-10 RX ADMIN — DILTIAZEM HYDROCHLORIDE SCH MG: 30 TABLET, FILM COATED ORAL at 08:53

## 2023-02-10 RX ADMIN — NICOTINE SCH MG: 21 PATCH TRANSDERMAL at 08:54

## 2023-02-10 RX ADMIN — OXYCODONE HYDROCHLORIDE AND ACETAMINOPHEN SCH MG: 500 TABLET ORAL at 08:55

## 2023-02-10 RX ADMIN — SODIUM HYPOCHLORITE SCH ML: 1.25 SOLUTION TOPICAL at 09:00

## 2023-02-10 RX ADMIN — MUPIROCIN SCH GM: 20 OINTMENT TOPICAL at 09:19

## 2023-02-10 RX ADMIN — ACETYLCYSTEINE SCH MG: 100 INHALANT RESPIRATORY (INHALATION) at 07:50

## 2023-02-10 RX ADMIN — ASPIRIN 81 MG SCH MG: 81 TABLET ORAL at 08:52

## 2023-02-10 RX ADMIN — Medication SCH EACH: at 11:32

## 2023-02-10 RX ADMIN — FUROSEMIDE SCH MG: 40 TABLET ORAL at 08:53

## 2023-02-10 RX ADMIN — INSULIN HUMAN PRN UNITS: 100 INJECTION, SOLUTION PARENTERAL at 11:34

## 2023-02-10 RX ADMIN — Medication SCH EACH: at 06:00

## 2023-02-10 RX ADMIN — ATORVASTATIN CALCIUM SCH MG: 40 TABLET, FILM COATED ORAL at 22:27

## 2023-02-10 RX ADMIN — ALBUTEROL SULFATE SCH MG: 2.5 SOLUTION RESPIRATORY (INHALATION) at 02:28

## 2023-02-10 RX ADMIN — CHLORHEXIDINE GLUCONATE 0.12% ORAL RINSE SCH ML: 1.2 LIQUID ORAL at 08:54

## 2023-02-10 RX ADMIN — Medication SCH MG: at 02:28

## 2023-02-10 RX ADMIN — Medication SCH MG: at 14:37

## 2023-02-10 RX ADMIN — AMIODARONE HYDROCHLORIDE SCH MG: 200 TABLET ORAL at 08:53

## 2023-02-10 RX ADMIN — INSULIN HUMAN PRN UNITS: 100 INJECTION, SOLUTION PARENTERAL at 17:33

## 2023-02-10 RX ADMIN — Medication SCH MG: at 07:50

## 2023-02-10 RX ADMIN — HEPARIN SODIUM SCH UNITS: 5000 INJECTION INTRAVENOUS; SUBCUTANEOUS at 09:36

## 2023-02-10 RX ADMIN — Medication PRN ML: at 09:53

## 2023-02-10 RX ADMIN — MEROPENEM SCH MLS/HR: 1 INJECTION INTRAVENOUS at 23:20

## 2023-02-10 RX ADMIN — DOCUSATE SODIUM SCH MG: 50 LIQUID ORAL at 08:51

## 2023-02-10 RX ADMIN — ACETYLCYSTEINE SCH MG: 100 INHALANT RESPIRATORY (INHALATION) at 23:49

## 2023-02-10 RX ADMIN — Medication SCH EACH: at 17:31

## 2023-02-10 RX ADMIN — ALBUTEROL SULFATE SCH MG: 2.5 SOLUTION RESPIRATORY (INHALATION) at 07:50

## 2023-02-10 RX ADMIN — INSULIN GLARGINE SCH UNIT: 100 INJECTION, SOLUTION SUBCUTANEOUS at 22:30

## 2023-02-10 RX ADMIN — Medication SCH TAB: at 08:52

## 2023-02-10 RX ADMIN — MUPIROCIN SCH GM: 20 OINTMENT TOPICAL at 22:28

## 2023-02-10 RX ADMIN — ALBUTEROL SULFATE SCH MG: 2.5 SOLUTION RESPIRATORY (INHALATION) at 20:04

## 2023-02-10 RX ADMIN — CHLORHEXIDINE GLUCONATE 0.12% ORAL RINSE SCH ML: 1.2 LIQUID ORAL at 17:30

## 2023-02-10 RX ADMIN — FERROUS SULFATE TAB 325 MG (65 MG ELEMENTAL FE) SCH MG: 325 (65 FE) TAB at 08:53

## 2023-02-10 RX ADMIN — FUROSEMIDE SCH MG: 40 TABLET ORAL at 17:30

## 2023-02-10 RX ADMIN — ALBUTEROL SULFATE SCH MG: 2.5 SOLUTION RESPIRATORY (INHALATION) at 14:37

## 2023-02-10 RX ADMIN — DEXTROSE MONOHYDRATE SCH MLS/HR: 50 INJECTION, SOLUTION INTRAVENOUS at 09:01

## 2023-02-10 RX ADMIN — Medication SCH MG: at 20:04

## 2023-02-10 RX ADMIN — HEPARIN SODIUM SCH UNITS: 5000 INJECTION INTRAVENOUS; SUBCUTANEOUS at 22:28

## 2023-02-10 RX ADMIN — PANTOPRAZOLE SODIUM SCH MG: 40 GRANULE, DELAYED RELEASE ORAL at 08:53

## 2023-02-10 RX ADMIN — MEROPENEM SCH MLS/HR: 1 INJECTION INTRAVENOUS at 10:58

## 2023-02-10 RX ADMIN — ACETYLCYSTEINE SCH MG: 100 INHALANT RESPIRATORY (INHALATION) at 14:37

## 2023-02-10 NOTE — NUR
RN NOTES



PHARMACY DELIVERED DAKINS SOLUTION. WOUND CARE PROVIDED TO PATIENT. SACRAL WOUND PACKED AND 
COVERED. WILL CONTINUE TO MONITOR.

## 2023-02-10 NOTE — NUR
RN NOTES



DAKINS SOLUTION, NOT AVAILABLE FROM PHARMACY, WILL BRING UP TO UNIT. WILL DO COMPLETE WOUND 
CARE WHEN SOLUTION BECOMES AVAILABLE.

## 2023-02-10 NOTE — NUR
TELE RN OPENING NOTES:



RECEIVED PATIENT AWAKE IN BED, BED IN LOW POSITION, CALL LIGHTS WITHIN REACH, NO COMPLAIN OF 
PAIN AND DISCOMFORT AT THIS TIME, ON T-PIECE VENT  SATURATING %, PATIENT ON TELE 
MONITOR-SR-74, ON G TUBE FEEDING OF GLUCERNA @55ML/HR INFUSING WELL, HEAD OF THE BED TO 
REMAIN AT 45 DEGREE, ASPIRATION PRECAUTION, PATIENT ON CONDOM CATHETER- 100 CC URINE OUTPUT, 
PATIENT KEPT CLEAN AND DRY ALL NEEDS MET WILL CONTINUE TO MONITOR.

## 2023-02-10 NOTE — NUR
TELE RN CLOSING NOTES



PATIENT SLEEPING IN BED, EASILY BE AWAKEN BY VERBAL AND TACTILE STIMULI. A/O X 2-3. HOOKED 
TO VENTILATOR AND TOLERATING CURRENT SETTINGS. PATIENT WITH TRACHEOSTOMY TUBE IN PLACE. ON G 
TUBE FEEDING OF GLUCERNA 55ML/HR, TOLERATING WELL. ON BARCENAS CATHETER, DRAINED 950ML  WITH 
COLOR YELLOW URINE OUTPUT. ON TELE MONITOR-SR 78. MEDICATIONS GIVEN AS SCHEDULED. NO C/O OF 
PAIN AND DISCOMFORT AT THIS TIME. SAFETY MEASURES MAINTAINED WITH BED ON LOWEST AND LOCKED 
POSITION. CALL LIGHT AND TRAY WITHIN EASY REACH. SIDE RAILS UP X 2. WILL ENDORSE TO THE NEXT 
SHIFT FOR ISAMAR.

## 2023-02-10 NOTE — NUR
TELE RN OPENING NOTES



RECEIVED PATIENT SLEEPING IN BED, TRACH IN PLACE. NO S/S OF RESPIRATORY DISTRESS. ALERT TO 
VERBAL AND TACTILE STIMULI. A/Ox 2, UNDERSTANDS Spanish AND SOME ENGLISH. IV ACCESS R FA 
#20G S/L. PATIENT ON TELE MONITORING SHOWING SINUS RHYTHM HR 69. NO S/S OF CARDIAC DISTRESS. 
PATIENT ON G-TUBE FEEDING, GLUCERNA @ 55 ML/HR, TOLERATING WELL. HAS FC DRAINING YELLOW 
URINE. SKIN ISSUES: SACRAL WOUND, L BACK SKIN TEAR, MULTIPLE SCABS. DRESSINGS INTACT. SAFETY 
MEASURES IN PLACE: BED LOCKED AND IN LOWEST POSITION, HOB ELEVATED, SIDE RAILS UPx2, CALL 
LIGHT WITHIN REACH. WILL CONTINUE TO MONITOR.

## 2023-02-10 NOTE — NUR
TELE RN CLOSING NOTES



PATIENT SLEEPING IN BED, TRACH IN PLACE. STABLE ON MECHANICAL VENT, 100% O2 SAT. NO S/S OF 
RESPIRATORY DISTRESS. ALERT TO VERBAL AND TACTILE STIMULI. A/Ox2 Malay 1ST LANGUAGE, 
UNDERSTANDS SOME ENGLISH. IV ACCESS R FA #20G S/L. PATIENT ON TELE MONITORING SHOWING SINUS 
RHYTHM HR 67. NO S/S OF CARDIAC DISTRESS. PATIENT ON G-TUBE FEEDING, GLUCERNA @ 55 ML/HR, 
TOLERATING WELL. HAS FC DRAINING YELLOW URINE 620 CC OUTPUT. SKIN ISSUES: SACRAL WOUND, L 
BACK SKIN TEAR, MULTIPLE SCABS. DRESSINGS INTACT. ALL PRESCRIBED MEDICATION ADMINISTERED. 
ALL NEEDS ATTENDED TO. SAFETY MEASURES MAINTAINED: BED LOCKED AND IN LOWEST POSITION, HOB 
ELEVATED, SIDE RAILS UPx2, CALL LIGHT WITHIN REACH. WILL ENDORSE TO NEXT SHIFT ANY ISAMAR.

## 2023-02-11 VITALS — SYSTOLIC BLOOD PRESSURE: 118 MMHG | DIASTOLIC BLOOD PRESSURE: 66 MMHG

## 2023-02-11 VITALS — SYSTOLIC BLOOD PRESSURE: 133 MMHG | DIASTOLIC BLOOD PRESSURE: 75 MMHG

## 2023-02-11 VITALS — DIASTOLIC BLOOD PRESSURE: 56 MMHG | SYSTOLIC BLOOD PRESSURE: 98 MMHG

## 2023-02-11 VITALS — DIASTOLIC BLOOD PRESSURE: 64 MMHG | SYSTOLIC BLOOD PRESSURE: 96 MMHG

## 2023-02-11 VITALS — DIASTOLIC BLOOD PRESSURE: 62 MMHG | SYSTOLIC BLOOD PRESSURE: 113 MMHG

## 2023-02-11 VITALS — DIASTOLIC BLOOD PRESSURE: 67 MMHG | SYSTOLIC BLOOD PRESSURE: 104 MMHG

## 2023-02-11 LAB
BASOPHILS # BLD AUTO: 0.1 K/UL (ref 0–0.2)
BASOPHILS NFR BLD AUTO: 0.7 % (ref 0–2)
BUN SERPL-MCNC: 52 MG/DL (ref 7–18)
CALCIUM SERPL-MCNC: 10 MG/DL (ref 8.5–10.1)
CHLORIDE SERPL-SCNC: 101 MMOL/L (ref 98–107)
CO2 SERPL-SCNC: 34 MMOL/L (ref 21–32)
CREAT SERPL-MCNC: 1.7 MG/DL (ref 0.6–1.3)
EOSINOPHIL NFR BLD AUTO: 4.4 % (ref 0–6)
GLUCOSE SERPL-MCNC: 53 MG/DL (ref 74–106)
HCT VFR BLD AUTO: 28 % (ref 39–51)
HGB BLD-MCNC: 9.1 G/DL (ref 13.5–17.5)
LYMPHOCYTES NFR BLD AUTO: 1.2 K/UL (ref 0.8–4.8)
LYMPHOCYTES NFR BLD AUTO: 15.1 % (ref 20–44)
MAGNESIUM SERPL-MCNC: 2.2 MG/DL (ref 1.8–2.4)
MCHC RBC AUTO-ENTMCNC: 33 G/DL (ref 31–36)
MCV RBC AUTO: 87 FL (ref 80–96)
MONOCYTES NFR BLD AUTO: 0.6 K/UL (ref 0.1–1.3)
MONOCYTES NFR BLD AUTO: 7.5 % (ref 2–12)
NEUTROPHILS # BLD AUTO: 5.6 K/UL (ref 1.8–8.9)
NEUTROPHILS NFR BLD AUTO: 72.3 % (ref 43–81)
PHOSPHATE SERPL-MCNC: 4.2 MG/DL (ref 2.5–4.9)
PLATELET # BLD AUTO: 362 K/UL (ref 150–450)
POTASSIUM SERPL-SCNC: 4 MMOL/L (ref 3.5–5.1)
RBC # BLD AUTO: 3.19 MIL/UL (ref 4.5–6)
SODIUM SERPL-SCNC: 141 MMOL/L (ref 136–145)
WBC NRBC COR # BLD AUTO: 7.8 K/UL (ref 4.3–11)

## 2023-02-11 RX ADMIN — ALBUTEROL SULFATE SCH MG: 2.5 SOLUTION RESPIRATORY (INHALATION) at 01:45

## 2023-02-11 RX ADMIN — INSULIN HUMAN PRN UNITS: 100 INJECTION, SOLUTION PARENTERAL at 00:16

## 2023-02-11 RX ADMIN — MEROPENEM SCH MLS/HR: 1 INJECTION INTRAVENOUS at 22:42

## 2023-02-11 RX ADMIN — ALBUTEROL SULFATE SCH MG: 2.5 SOLUTION RESPIRATORY (INHALATION) at 13:57

## 2023-02-11 RX ADMIN — ATORVASTATIN CALCIUM SCH MG: 40 TABLET, FILM COATED ORAL at 21:01

## 2023-02-11 RX ADMIN — ACETYLCYSTEINE SCH MG: 100 INHALANT RESPIRATORY (INHALATION) at 08:37

## 2023-02-11 RX ADMIN — Medication SCH MG: at 13:58

## 2023-02-11 RX ADMIN — FUROSEMIDE SCH MG: 40 TABLET ORAL at 16:07

## 2023-02-11 RX ADMIN — Medication SCH MG: at 01:45

## 2023-02-11 RX ADMIN — CHLORHEXIDINE GLUCONATE 0.12% ORAL RINSE SCH ML: 1.2 LIQUID ORAL at 08:52

## 2023-02-11 RX ADMIN — Medication SCH EACH: at 00:17

## 2023-02-11 RX ADMIN — NICOTINE SCH MG: 21 PATCH TRANSDERMAL at 08:49

## 2023-02-11 RX ADMIN — Medication SCH EACH: at 06:00

## 2023-02-11 RX ADMIN — FERROUS SULFATE TAB 325 MG (65 MG ELEMENTAL FE) SCH MG: 325 (65 FE) TAB at 08:49

## 2023-02-11 RX ADMIN — TRAZODONE HYDROCHLORIDE SCH MG: 50 TABLET ORAL at 21:01

## 2023-02-11 RX ADMIN — MEROPENEM SCH MLS/HR: 1 INJECTION INTRAVENOUS at 11:08

## 2023-02-11 RX ADMIN — DOCUSATE SODIUM SCH MG: 50 LIQUID ORAL at 08:51

## 2023-02-11 RX ADMIN — HEPARIN SODIUM SCH UNITS: 5000 INJECTION INTRAVENOUS; SUBCUTANEOUS at 20:52

## 2023-02-11 RX ADMIN — ASPIRIN 81 MG SCH MG: 81 TABLET ORAL at 08:49

## 2023-02-11 RX ADMIN — Medication SCH MG: at 19:37

## 2023-02-11 RX ADMIN — MUPIROCIN SCH APPLIC: 20 OINTMENT TOPICAL at 20:51

## 2023-02-11 RX ADMIN — ACETYLCYSTEINE SCH MG: 100 INHALANT RESPIRATORY (INHALATION) at 14:35

## 2023-02-11 RX ADMIN — Medication SCH MG: at 08:38

## 2023-02-11 RX ADMIN — Medication SCH EACH: at 12:01

## 2023-02-11 RX ADMIN — ALBUTEROL SULFATE SCH MG: 2.5 SOLUTION RESPIRATORY (INHALATION) at 19:37

## 2023-02-11 RX ADMIN — CHLORHEXIDINE GLUCONATE 0.12% ORAL RINSE SCH ML: 1.2 LIQUID ORAL at 16:07

## 2023-02-11 RX ADMIN — ACETYLCYSTEINE SCH MG: 100 INHALANT RESPIRATORY (INHALATION) at 23:27

## 2023-02-11 RX ADMIN — PANTOPRAZOLE SODIUM SCH MG: 40 GRANULE, DELAYED RELEASE ORAL at 08:49

## 2023-02-11 RX ADMIN — DEXTROSE MONOHYDRATE SCH MLS/HR: 50 INJECTION, SOLUTION INTRAVENOUS at 08:54

## 2023-02-11 RX ADMIN — INSULIN GLARGINE SCH UNIT: 100 INJECTION, SOLUTION SUBCUTANEOUS at 21:45

## 2023-02-11 RX ADMIN — OXYCODONE HYDROCHLORIDE AND ACETAMINOPHEN SCH MG: 500 TABLET ORAL at 08:49

## 2023-02-11 RX ADMIN — SODIUM HYPOCHLORITE SCH ML: 1.25 SOLUTION TOPICAL at 08:57

## 2023-02-11 RX ADMIN — Medication SCH TAB: at 08:50

## 2023-02-11 RX ADMIN — AMIODARONE HYDROCHLORIDE SCH MG: 200 TABLET ORAL at 08:51

## 2023-02-11 RX ADMIN — Medication SCH EACH: at 16:51

## 2023-02-11 RX ADMIN — DILTIAZEM HYDROCHLORIDE SCH MG: 30 TABLET, FILM COATED ORAL at 08:50

## 2023-02-11 RX ADMIN — Medication PRN ML: at 16:39

## 2023-02-11 RX ADMIN — FUROSEMIDE SCH MG: 40 TABLET ORAL at 08:49

## 2023-02-11 RX ADMIN — HEPARIN SODIUM SCH UNITS: 5000 INJECTION INTRAVENOUS; SUBCUTANEOUS at 08:52

## 2023-02-11 RX ADMIN — MUPIROCIN SCH GM: 20 OINTMENT TOPICAL at 08:59

## 2023-02-11 RX ADMIN — ALBUTEROL SULFATE SCH MG: 2.5 SOLUTION RESPIRATORY (INHALATION) at 08:37

## 2023-02-11 NOTE — NUR
RECEIVED PATIENT IN BED, ALERT/ORIENTED X2, Lithuanian SPEAKING ONLY, RESTLESS, TRACH 
DEPENDENT, NO RESPIRATORY DISTRESS, NOT IN APPARENT PAIN, PEG TUBE FEEDING INFUSING WELL, 
BARCENAS CATHETER DRAINING, KEPT AT HIGH MCKNIGHT'S, KEPT SAFE, WILL CONTINUE TO MONITOR.

## 2023-02-11 NOTE — NUR
TELE RN CLOSING NOTES;



RECEIVED PATIENT AWAKE IN BED, BED IN LOW POSITION CALL LIGHTS WITHIN REACH, NO COMPLAIN OF 
PAIN AND DISCOMFORT AT THIS TIME, ON [facility] VENT- ON T-PIECE, SATURATING WELL, PATIENT ON TELE 
MONITOR IN STABLE  CONDITION, PATIENT IS  A/OX2-3 Argentine SPEAKING, ON G TUBE FEEDING OF 
GLUCERNA @55/ML /HR INFUSING WELL, PATIENT KEPT CLEAN AND DRY ALL NEEDS  MET WILL CONTINUE 
TO MONITOR.

## 2023-02-11 NOTE — NUR
TELE RN OPENING NOTES:



RECEIVED PATIENT AWAKE IN BED, BED IN LOW POSITION, CALL LIGHTS WITHIN REACH, NO COMPLAIN OF 
PAIN AND DISCOMFORT AT THIS TIME, ON T-PIECE VENT  SATURATING %, PATIENT ON TELE 
MONITOR-SR-74, ON G TUBE FEEDING OF GLUCERNA @55ML/HR INFUSING WELL, HEAD OF THE BED TO 
REMAIN AT 45 DEGREE, ASPIRATION PRECAUTION, PATIENT ON BARCENAS, WILL CONTINUE TO MONITOR.

## 2023-02-11 NOTE — NUR
TELE RN CLOSING NOTES



PATIENT AWAKE IN BED, HOOKED TO VENTILATOR AND TOLERATING CURRENT SETTINGS. PATIENT WITH 
TRACHEOSTOMY TUBE IN PLACE. ON G TUBE FEEDING OF GLUCERNA 55ML/HR, TOLERATING WELL. ON BARCENAS 
CATHETER, DRAINED 100 ML  WITH COLOR YELLOW URINE OUTPUT. ON TELE MONITOR-SR 79. MEDICATIONS 
GIVEN AS SCHEDULED. NO C/O OF PAIN AND DISCOMFORT AT THIS TIME. SAFETY MEASURES MAINTAINED 
WITH BED ON LOWEST AND LOCKED POSITION. CALL LIGHT AND TRAY WITHIN EASY REACH. SIDE RAILS UP 
X 2. WILL ENDORSE TO THE NEXT SHIFT FOR ISAMAR.

## 2023-02-11 NOTE — NUR
RN NOTES:



BLOOD SUGAR AT 0536-45 RECONNECT G TUBE FEEDING AND FLUSH WATER AND RE CHECK AFTER AT 0643 
BLOOD SUGAR  / NO INSULIN GIVEN WILL CONTINUE TO MONITOR-

## 2023-02-12 VITALS — DIASTOLIC BLOOD PRESSURE: 61 MMHG | SYSTOLIC BLOOD PRESSURE: 103 MMHG

## 2023-02-12 VITALS — DIASTOLIC BLOOD PRESSURE: 62 MMHG | SYSTOLIC BLOOD PRESSURE: 107 MMHG

## 2023-02-12 VITALS — DIASTOLIC BLOOD PRESSURE: 62 MMHG | SYSTOLIC BLOOD PRESSURE: 106 MMHG

## 2023-02-12 VITALS — SYSTOLIC BLOOD PRESSURE: 111 MMHG | DIASTOLIC BLOOD PRESSURE: 62 MMHG

## 2023-02-12 LAB
BASOPHILS # BLD AUTO: 0 K/UL (ref 0–0.2)
BASOPHILS NFR BLD AUTO: 0.1 % (ref 0–2)
BUN SERPL-MCNC: 59 MG/DL (ref 7–18)
CALCIUM SERPL-MCNC: 9.7 MG/DL (ref 8.5–10.1)
CHLORIDE SERPL-SCNC: 98 MMOL/L (ref 98–107)
CO2 SERPL-SCNC: 33 MMOL/L (ref 21–32)
CREAT SERPL-MCNC: 1.8 MG/DL (ref 0.6–1.3)
EOSINOPHIL NFR BLD AUTO: 0 % (ref 0–6)
GLUCOSE SERPL-MCNC: 225 MG/DL (ref 74–106)
HCT VFR BLD AUTO: 26 % (ref 39–51)
HGB BLD-MCNC: 8.5 G/DL (ref 13.5–17.5)
LYMPHOCYTES NFR BLD AUTO: 0.6 K/UL (ref 0.8–4.8)
LYMPHOCYTES NFR BLD AUTO: 4.7 % (ref 20–44)
MAGNESIUM SERPL-MCNC: 2.4 MG/DL (ref 1.8–2.4)
MCHC RBC AUTO-ENTMCNC: 33 G/DL (ref 31–36)
MCV RBC AUTO: 86 FL (ref 80–96)
MONOCYTES NFR BLD AUTO: 0.6 K/UL (ref 0.1–1.3)
MONOCYTES NFR BLD AUTO: 5.3 % (ref 2–12)
NEUTROPHILS # BLD AUTO: 10.8 K/UL (ref 1.8–8.9)
NEUTROPHILS NFR BLD AUTO: 89.9 % (ref 43–81)
PHOSPHATE SERPL-MCNC: 3.3 MG/DL (ref 2.5–4.9)
PLATELET # BLD AUTO: 365 K/UL (ref 150–450)
POTASSIUM SERPL-SCNC: 4.1 MMOL/L (ref 3.5–5.1)
RBC # BLD AUTO: 3.01 MIL/UL (ref 4.5–6)
SODIUM SERPL-SCNC: 139 MMOL/L (ref 136–145)
WBC NRBC COR # BLD AUTO: 12 K/UL (ref 4.3–11)

## 2023-02-12 RX ADMIN — Medication SCH EACH: at 05:14

## 2023-02-12 RX ADMIN — MUPIROCIN SCH APPLIC: 20 OINTMENT TOPICAL at 20:53

## 2023-02-12 RX ADMIN — Medication SCH MG: at 13:50

## 2023-02-12 RX ADMIN — Medication SCH MG: at 19:52

## 2023-02-12 RX ADMIN — INSULIN HUMAN PRN UNITS: 100 INJECTION, SOLUTION PARENTERAL at 12:02

## 2023-02-12 RX ADMIN — ALBUTEROL SULFATE SCH MG: 2.5 SOLUTION RESPIRATORY (INHALATION) at 13:50

## 2023-02-12 RX ADMIN — ALBUTEROL SULFATE SCH MG: 2.5 SOLUTION RESPIRATORY (INHALATION) at 07:51

## 2023-02-12 RX ADMIN — ACETYLCYSTEINE SCH MG: 100 INHALANT RESPIRATORY (INHALATION) at 07:51

## 2023-02-12 RX ADMIN — INSULIN HUMAN PRN UNITS: 100 INJECTION, SOLUTION PARENTERAL at 00:42

## 2023-02-12 RX ADMIN — ATORVASTATIN CALCIUM SCH MG: 40 TABLET, FILM COATED ORAL at 21:23

## 2023-02-12 RX ADMIN — NICOTINE SCH MG: 21 PATCH TRANSDERMAL at 08:22

## 2023-02-12 RX ADMIN — INSULIN HUMAN PRN UNITS: 100 INJECTION, SOLUTION PARENTERAL at 18:00

## 2023-02-12 RX ADMIN — Medication SCH EACH: at 00:36

## 2023-02-12 RX ADMIN — MUPIROCIN SCH APPLIC: 20 OINTMENT TOPICAL at 08:28

## 2023-02-12 RX ADMIN — INSULIN HUMAN PRN UNITS: 100 INJECTION, SOLUTION PARENTERAL at 05:16

## 2023-02-12 RX ADMIN — CHLORHEXIDINE GLUCONATE 0.12% ORAL RINSE SCH ML: 1.2 LIQUID ORAL at 17:35

## 2023-02-12 RX ADMIN — DEXTROSE MONOHYDRATE SCH MLS/HR: 50 INJECTION, SOLUTION INTRAVENOUS at 08:26

## 2023-02-12 RX ADMIN — Medication SCH TAB: at 08:26

## 2023-02-12 RX ADMIN — Medication SCH EACH: at 11:48

## 2023-02-12 RX ADMIN — OXYCODONE HYDROCHLORIDE AND ACETAMINOPHEN SCH MG: 500 TABLET ORAL at 08:26

## 2023-02-12 RX ADMIN — DILTIAZEM HYDROCHLORIDE SCH MG: 30 TABLET, FILM COATED ORAL at 08:25

## 2023-02-12 RX ADMIN — ACETYLCYSTEINE SCH MG: 100 INHALANT RESPIRATORY (INHALATION) at 15:14

## 2023-02-12 RX ADMIN — Medication SCH EACH: at 23:34

## 2023-02-12 RX ADMIN — INSULIN GLARGINE SCH UNIT: 100 INJECTION, SOLUTION SUBCUTANEOUS at 21:47

## 2023-02-12 RX ADMIN — PANTOPRAZOLE SODIUM SCH MG: 40 GRANULE, DELAYED RELEASE ORAL at 08:22

## 2023-02-12 RX ADMIN — ASPIRIN 81 MG SCH MG: 81 TABLET ORAL at 08:28

## 2023-02-12 RX ADMIN — DOCUSATE SODIUM SCH MG: 50 LIQUID ORAL at 08:23

## 2023-02-12 RX ADMIN — Medication SCH EACH: at 18:04

## 2023-02-12 RX ADMIN — HEPARIN SODIUM SCH UNITS: 5000 INJECTION INTRAVENOUS; SUBCUTANEOUS at 20:35

## 2023-02-12 RX ADMIN — FUROSEMIDE SCH MG: 40 TABLET ORAL at 08:23

## 2023-02-12 RX ADMIN — MEROPENEM SCH MLS/HR: 1 INJECTION INTRAVENOUS at 11:36

## 2023-02-12 RX ADMIN — HEPARIN SODIUM SCH UNITS: 5000 INJECTION INTRAVENOUS; SUBCUTANEOUS at 08:23

## 2023-02-12 RX ADMIN — Medication SCH MG: at 07:51

## 2023-02-12 RX ADMIN — TRAZODONE HYDROCHLORIDE SCH MG: 50 TABLET ORAL at 21:23

## 2023-02-12 RX ADMIN — FERROUS SULFATE TAB 325 MG (65 MG ELEMENTAL FE) SCH MG: 325 (65 FE) TAB at 08:22

## 2023-02-12 RX ADMIN — INSULIN HUMAN PRN UNITS: 100 INJECTION, SOLUTION PARENTERAL at 23:33

## 2023-02-12 RX ADMIN — ALBUTEROL SULFATE SCH MG: 2.5 SOLUTION RESPIRATORY (INHALATION) at 02:09

## 2023-02-12 RX ADMIN — FUROSEMIDE SCH MG: 40 TABLET ORAL at 17:35

## 2023-02-12 RX ADMIN — ACETYLCYSTEINE SCH MG: 100 INHALANT RESPIRATORY (INHALATION) at 23:42

## 2023-02-12 RX ADMIN — SODIUM HYPOCHLORITE SCH ML: 1.25 SOLUTION TOPICAL at 08:29

## 2023-02-12 RX ADMIN — Medication SCH MG: at 02:09

## 2023-02-12 RX ADMIN — ALBUTEROL SULFATE SCH MG: 2.5 SOLUTION RESPIRATORY (INHALATION) at 19:51

## 2023-02-12 RX ADMIN — AMIODARONE HYDROCHLORIDE SCH MG: 200 TABLET ORAL at 08:24

## 2023-02-12 RX ADMIN — SCOPOLAMINE SCH EA: 1 PATCH, EXTENDED RELEASE TRANSDERMAL at 12:07

## 2023-02-12 RX ADMIN — Medication PRN ML: at 20:52

## 2023-02-12 RX ADMIN — MEROPENEM SCH MLS/HR: 1 INJECTION INTRAVENOUS at 23:02

## 2023-02-12 RX ADMIN — CHLORHEXIDINE GLUCONATE 0.12% ORAL RINSE SCH ML: 1.2 LIQUID ORAL at 08:24

## 2023-02-12 NOTE — NUR
ALERT/ORIENTED X2, RESTLESS, NO COMPLAIN OF PAIN, STABLE ON CPAP, SPO2 99%, GLUCERNA AT 55 
ML/HR, TOLERATED WELL, NO ABDOMINAL DISTENTION, NO VOMITING, BARCENAS CATHETER DRAINING, 
ACCUCHECK, SLIDING SCALE, LANTUS 24 UNITS, DISCHARGE PLANNING.

## 2023-02-12 NOTE — NUR
TELE RN OPENING NOTES:



RECEIVED PATIENT AWAKE IN BED, BED IN LOW POSITION, CALL LIGHTS WITHIN REACH, NO COMPLAIN OF 
PAIN AND DISCOMFORT AT THIS TIME, STABLE ON CPAP, SPO2 99%, GLUCERNA AT 55 ML/HR, TOLERATED 
WELL, NO ABDOMINAL DISTENTION, NO VOMITING, BARCENAS CATHETER DRAINING, ACCUCHECK, SLIDING 
SCALE, LANTUS 24 UNITS, DISCHARGE PLANNING AS NOTED. PATIENT ON ASPIRATION PRECAUTION, ON 
BARCENAS AS WELL, WILL CONTINUE TO MONITOR.

## 2023-02-12 NOTE — NUR
TELE RN CLOSING NOTES



PATIENT AWAKE IN BED, HOOKED TO VENTILATOR AND TOLERATING CURRENT SETTINGS. PATIENT WITH 
CPAP TUBE IN PLACE. ON G TUBE FEEDING OF GLUCERNA 60ML/HR, TOLERATING WELL. ON BARCENAS 
CATHETER, DRAINED 100 ML  WITH COLOR YELLOW URINE OUTPUT. ON TELE MONITOR-SR 78. MEDICATIONS 
GIVEN AS SCHEDULED. NO C/O OF PAIN AND DISCOMFORT AT THIS TIME. SAFETY MEASURES MAINTAINED 
WITH BED ON LOWEST AND LOCKED POSITION. CALL LIGHT AND TRAY WITHIN EASY REACH. SIDE RAILS UP 
X 2. WILL ENDORSE TO THE NEXT SHIFT FOR ISAMAR.

## 2023-02-12 NOTE — NUR
TELE RN OPENING NOTES:

RECEIVED PATIENT AWAKE IN BED. DAUGHTER AND WIFE AT BED SIDE. PATIENT IS A/O TIMES 4. 
NONVERBAL.NO PAIN NOTED. NO COMPLAIN OF PAIN AND DISCOMFORT AT THIS TIME. PATIENT ABLE TO 
MAKE NEEDS KNOWN IN Nigerian LANGUAGE.PATIENT IS ON VENT AND TOLERATING WELL. PATIENT IS ON 
GTUBE GLUCERNA AT 60 ML/HR.  TOLERATED WELL, NO ABDOMINAL DISTENTION, NO VOMITING NOTED. 
CHECKED THE GTUBE PLACEMENT INTACT AND PATENT. FLUSHING WELL. BARCENAS CATHETER INTACT AND 
DRAINING URINE BY GRAVITY. ALL SAFETY MEASURES IN PLACE. HOB ELEVATED FOR ASPIRATION 
PRECAUTION. BED LOCKED IN THE LOWEST POSITION. CALL LIGHT AND TABLE IN EASY CHECK. SIDE 
RAILS UP TIMES 2. CALL LIGHT IN EASY REACH. WILL CONTINUE TO MONITOR CLOSELY.

## 2023-02-13 VITALS — SYSTOLIC BLOOD PRESSURE: 122 MMHG | DIASTOLIC BLOOD PRESSURE: 61 MMHG

## 2023-02-13 VITALS — DIASTOLIC BLOOD PRESSURE: 59 MMHG | SYSTOLIC BLOOD PRESSURE: 104 MMHG

## 2023-02-13 VITALS — DIASTOLIC BLOOD PRESSURE: 63 MMHG | SYSTOLIC BLOOD PRESSURE: 103 MMHG

## 2023-02-13 LAB
BASOPHILS # BLD AUTO: 0 K/UL (ref 0–0.2)
BASOPHILS NFR BLD AUTO: 0.3 % (ref 0–2)
BUN SERPL-MCNC: 69 MG/DL (ref 7–18)
BUN SERPL-MCNC: 73 MG/DL (ref 7–18)
CALCIUM SERPL-MCNC: 10 MG/DL (ref 8.5–10.1)
CALCIUM SERPL-MCNC: 9.6 MG/DL (ref 8.5–10.1)
CHLORIDE SERPL-SCNC: 100 MMOL/L (ref 98–107)
CHLORIDE SERPL-SCNC: 99 MMOL/L (ref 98–107)
CO2 SERPL-SCNC: 35 MMOL/L (ref 21–32)
CO2 SERPL-SCNC: 37 MMOL/L (ref 21–32)
CREAT SERPL-MCNC: 1.6 MG/DL (ref 0.6–1.3)
CREAT SERPL-MCNC: 1.6 MG/DL (ref 0.6–1.3)
EOSINOPHIL NFR BLD AUTO: 1.2 % (ref 0–6)
GLUCOSE SERPL-MCNC: 203 MG/DL (ref 74–106)
GLUCOSE SERPL-MCNC: 219 MG/DL (ref 74–106)
HCT VFR BLD AUTO: 25 % (ref 39–51)
HGB BLD-MCNC: 8.1 G/DL (ref 13.5–17.5)
LYMPHOCYTES NFR BLD AUTO: 0.8 K/UL (ref 0.8–4.8)
LYMPHOCYTES NFR BLD AUTO: 8.6 % (ref 20–44)
MAGNESIUM SERPL-MCNC: 2.4 MG/DL (ref 1.8–2.4)
MCHC RBC AUTO-ENTMCNC: 32 G/DL (ref 31–36)
MCV RBC AUTO: 87 FL (ref 80–96)
MONOCYTES NFR BLD AUTO: 0.7 K/UL (ref 0.1–1.3)
MONOCYTES NFR BLD AUTO: 7.1 % (ref 2–12)
NEUTROPHILS # BLD AUTO: 7.9 K/UL (ref 1.8–8.9)
NEUTROPHILS NFR BLD AUTO: 82.8 % (ref 43–81)
PHOSPHATE SERPL-MCNC: 3.8 MG/DL (ref 2.5–4.9)
PLATELET # BLD AUTO: 299 K/UL (ref 150–450)
POTASSIUM SERPL-SCNC: 3.9 MMOL/L (ref 3.5–5.1)
POTASSIUM SERPL-SCNC: 4 MMOL/L (ref 3.5–5.1)
RBC # BLD AUTO: 2.87 MIL/UL (ref 4.5–6)
SODIUM SERPL-SCNC: 141 MMOL/L (ref 136–145)
SODIUM SERPL-SCNC: 142 MMOL/L (ref 136–145)
WBC NRBC COR # BLD AUTO: 9.5 K/UL (ref 4.3–11)

## 2023-02-13 RX ADMIN — TRAZODONE HYDROCHLORIDE SCH MG: 50 TABLET ORAL at 21:58

## 2023-02-13 RX ADMIN — ALBUTEROL SULFATE SCH MG: 2.5 SOLUTION RESPIRATORY (INHALATION) at 20:28

## 2023-02-13 RX ADMIN — CHLORHEXIDINE GLUCONATE 0.12% ORAL RINSE SCH ML: 1.2 LIQUID ORAL at 17:01

## 2023-02-13 RX ADMIN — ALBUTEROL SULFATE SCH MG: 2.5 SOLUTION RESPIRATORY (INHALATION) at 01:44

## 2023-02-13 RX ADMIN — Medication SCH EACH: at 18:19

## 2023-02-13 RX ADMIN — FUROSEMIDE SCH MG: 40 TABLET ORAL at 09:00

## 2023-02-13 RX ADMIN — Medication SCH MG: at 01:44

## 2023-02-13 RX ADMIN — NICOTINE SCH MG: 21 PATCH TRANSDERMAL at 09:52

## 2023-02-13 RX ADMIN — OXYCODONE HYDROCHLORIDE AND ACETAMINOPHEN SCH MG: 500 TABLET ORAL at 09:50

## 2023-02-13 RX ADMIN — SODIUM HYPOCHLORITE SCH ML: 1.25 SOLUTION TOPICAL at 09:52

## 2023-02-13 RX ADMIN — HEPARIN SODIUM SCH UNITS: 5000 INJECTION INTRAVENOUS; SUBCUTANEOUS at 21:59

## 2023-02-13 RX ADMIN — Medication SCH EACH: at 11:40

## 2023-02-13 RX ADMIN — FERROUS SULFATE TAB 325 MG (65 MG ELEMENTAL FE) SCH MG: 325 (65 FE) TAB at 09:50

## 2023-02-13 RX ADMIN — Medication SCH MG: at 13:41

## 2023-02-13 RX ADMIN — CHLORHEXIDINE GLUCONATE 0.12% ORAL RINSE SCH ML: 1.2 LIQUID ORAL at 09:51

## 2023-02-13 RX ADMIN — INSULIN HUMAN PRN UNITS: 100 INJECTION, SOLUTION PARENTERAL at 11:43

## 2023-02-13 RX ADMIN — Medication SCH MG: at 07:44

## 2023-02-13 RX ADMIN — ACETYLCYSTEINE SCH MG: 100 INHALANT RESPIRATORY (INHALATION) at 07:44

## 2023-02-13 RX ADMIN — Medication SCH MG: at 20:28

## 2023-02-13 RX ADMIN — PANTOPRAZOLE SODIUM SCH MG: 40 GRANULE, DELAYED RELEASE ORAL at 09:50

## 2023-02-13 RX ADMIN — INSULIN HUMAN PRN UNITS: 100 INJECTION, SOLUTION PARENTERAL at 05:36

## 2023-02-13 RX ADMIN — HEPARIN SODIUM SCH UNITS: 5000 INJECTION INTRAVENOUS; SUBCUTANEOUS at 09:50

## 2023-02-13 RX ADMIN — MUPIROCIN SCH APPLIC: 20 OINTMENT TOPICAL at 09:57

## 2023-02-13 RX ADMIN — DOCUSATE SODIUM SCH MG: 50 LIQUID ORAL at 09:50

## 2023-02-13 RX ADMIN — Medication SCH TAB: at 09:50

## 2023-02-13 RX ADMIN — MEROPENEM SCH MLS/HR: 1 INJECTION INTRAVENOUS at 11:29

## 2023-02-13 RX ADMIN — FUROSEMIDE SCH MG: 40 TABLET ORAL at 17:00

## 2023-02-13 RX ADMIN — INSULIN HUMAN PRN UNITS: 100 INJECTION, SOLUTION PARENTERAL at 18:18

## 2023-02-13 RX ADMIN — Medication SCH EACH: at 05:57

## 2023-02-13 RX ADMIN — DILTIAZEM HYDROCHLORIDE SCH MG: 30 TABLET, FILM COATED ORAL at 09:00

## 2023-02-13 RX ADMIN — ALBUTEROL SULFATE SCH MG: 2.5 SOLUTION RESPIRATORY (INHALATION) at 07:44

## 2023-02-13 RX ADMIN — DEXTROSE MONOHYDRATE SCH MLS/HR: 50 INJECTION, SOLUTION INTRAVENOUS at 09:57

## 2023-02-13 RX ADMIN — ACETYLCYSTEINE SCH MG: 100 INHALANT RESPIRATORY (INHALATION) at 23:32

## 2023-02-13 RX ADMIN — INSULIN GLARGINE SCH UNIT: 100 INJECTION, SOLUTION SUBCUTANEOUS at 22:40

## 2023-02-13 RX ADMIN — ASPIRIN 81 MG SCH MG: 81 TABLET ORAL at 09:50

## 2023-02-13 RX ADMIN — ATORVASTATIN CALCIUM SCH MG: 40 TABLET, FILM COATED ORAL at 21:57

## 2023-02-13 RX ADMIN — ACETYLCYSTEINE SCH MG: 100 INHALANT RESPIRATORY (INHALATION) at 15:54

## 2023-02-13 RX ADMIN — AMIODARONE HYDROCHLORIDE SCH MG: 200 TABLET ORAL at 09:00

## 2023-02-13 RX ADMIN — MUPIROCIN SCH APPLIC: 20 OINTMENT TOPICAL at 22:30

## 2023-02-13 RX ADMIN — ALBUTEROL SULFATE SCH MG: 2.5 SOLUTION RESPIRATORY (INHALATION) at 13:41

## 2023-02-13 NOTE — NUR
RN NOTE

PATIENT IS ASKING FOR MEDICATION FOR CONSTIPATION. MILK OF MAGNESIA WAS GIVEN AS PRN FOR 
CONSTIPATION. WILL MONITOR.

## 2023-02-13 NOTE — NUR
PT G-TUBE IS CLOGGED AND LEAKING WITH RESIDUAL OF 40ML. FLUSHED 30 ML OF WATER.  AND IS 
CURRENTLY INFUSING WELL.

## 2023-02-13 NOTE — NUR
TELE RN CLOSING NOTES:

PATIENT RESTING ON BED AND A/O X4, NONVERBAL. WITH NO COMPLAINT OF PAIN AND DISCOMFORT AT 
THIS TIME. PATIENT IS ON VENT CPAP AND TOLERATING WELL. ON TELE MONITOR CURRENTLY READING 
SINUS RHYTHM AT 81BPM. ON G-TUBE GLUCERNA AT 40ML/HR TOLERATING WELL, NO ABDOMINAL 
DISTENTION, NO VOMITING NOTED. CHECKED THE GTUBE PLACEMENT INTACT AND PATENT. FLUSHING WELL. 
BRACENAS CATHETER INTACT AND DRAINING URINE BY GRAVITY. DUE MEDS GIVEN. ALL SAFETY MEASURES IN 
PLACE. HOB ELEVATED FOR ASPIRATION PRECAUTION. BED LOCKED IN THE LOWEST POSITION. CALL LIGHT 
AND TABLE IN EASY CHECK. SIDE RAILS UP TIMES 2. CALL LIGHT IN EASY REACH. WILL ENDORSE TO 
NEXT SHIFT FOR ISAMAR.

## 2023-02-13 NOTE — NUR
RN NOTE

REPORTED TO DOCTOR CASSIE THAT PATIENT'S CARDIAC MONITOR READING ST ELEVATION AND ORDERED 
STAT EKG AND SERUM POTASSIUM. WILL MONITOR PATIENT.

## 2023-02-13 NOTE — NUR
TELE RN OPENING NOTES:

RECEIVED PATIENT AWAKE ON BED AND A/O X4, NONVERBAL. WITH NO COMPLAINT OF PAIN AND 
DISCOMFORT AT THIS TIME.PATIENT IS ON VENT CPAP AND TOLERATING WELL. ON TELE MONITOR 
CURRENTLY READING SINUS RHYTHM AT 92BPM. ON G-TUBE GLUCERNA AT 60 ML/HR TOLERATING WELL, NO 
ABDOMINAL DISTENTION, NO VOMITING NOTED. CHECKED THE GTUBE PLACEMENT INTACT AND PATENT. 
FLUSHING WELL. BARCENAS CATHETER INTACT AND DRAINING URINE BY GRAVITY. ALL SAFETY MEASURES IN 
PLACE. HOB ELEVATED FOR ASPIRATION PRECAUTION. BED LOCKED IN THE LOWEST POSITION. CALL LIGHT 
AND TABLE IN EASY CHECK. SIDE RAILS UP TIMES 2. CALL LIGHT IN EASY REACH. WILL CONTINUE TO 
MONITOR CLOSELY.

## 2023-02-13 NOTE — NUR
TELE RN NOTES:

 PATIENT AWAKE IN BED.  PATIENT IS A/O TIMES 4. NONVERBAL.NO PAIN NOTED. NO COMPLAIN OF PAIN 
AND DISCOMFORT AT THIS TIME. PATIENT ABLE TO MAKE NEEDS KNOWN IN Sami LANGUAGE.PATIENT 
IS ON CPAP AND TOLERATING WELL. PATIENT IS ON GTUBE GLUCERNA AT 60 ML/HR.  TOLERATED WELL, 
NO ABDOMINAL DISTENTION, NO VOMITING NOTED. CHECKED THE GTUBE PLACEMENT INTACT AND PATENT. 
FLUSHING WELL. BARCENAS CATHETER INTACT AND DRAINING URINE BY GRAVITY. ALL DUE MEDS GIVEN AS 
ORDERED.ALL SAFETY MEASURES IN PLACE. HOB ELEVATED FOR ASPIRATION PRECAUTION. BED LOCKED IN 
THE LOWEST POSITION. CALL LIGHT AND TABLE IN EASY CHECK. SIDE RAILS UP TIMES 2. CALL LIGHT 
IN EASY REACH. WILL ENDORSE FOR ISAMAR.

## 2023-02-14 VITALS — SYSTOLIC BLOOD PRESSURE: 102 MMHG | DIASTOLIC BLOOD PRESSURE: 58 MMHG

## 2023-02-14 VITALS — SYSTOLIC BLOOD PRESSURE: 91 MMHG | DIASTOLIC BLOOD PRESSURE: 56 MMHG

## 2023-02-14 VITALS — SYSTOLIC BLOOD PRESSURE: 100 MMHG | DIASTOLIC BLOOD PRESSURE: 64 MMHG

## 2023-02-14 VITALS — DIASTOLIC BLOOD PRESSURE: 57 MMHG | SYSTOLIC BLOOD PRESSURE: 93 MMHG

## 2023-02-14 VITALS — DIASTOLIC BLOOD PRESSURE: 47 MMHG | SYSTOLIC BLOOD PRESSURE: 90 MMHG

## 2023-02-14 LAB
BASE EXCESS BLDA CALC-SCNC: 15 MMOL/L
BASOPHILS # BLD AUTO: 0.1 K/UL (ref 0–0.2)
BASOPHILS NFR BLD AUTO: 0.8 % (ref 0–2)
BUN SERPL-MCNC: 75 MG/DL (ref 7–18)
CALCIUM SERPL-MCNC: 10 MG/DL (ref 8.5–10.1)
CHLORIDE SERPL-SCNC: 101 MMOL/L (ref 98–107)
CO2 SERPL-SCNC: 35 MMOL/L (ref 21–32)
CREAT SERPL-MCNC: 1.8 MG/DL (ref 0.6–1.3)
DO-HGB MFR BLDA: 121 MMHG
EOSINOPHIL NFR BLD AUTO: 2.1 % (ref 0–6)
GLUCOSE SERPL-MCNC: 197 MG/DL (ref 74–106)
HCT VFR BLD AUTO: 23 % (ref 39–51)
HGB BLD-MCNC: 7.7 G/DL (ref 13.5–17.5)
INHALED O2 CONCENTRATION: 35 %
LYMPHOCYTES NFR BLD AUTO: 1.1 K/UL (ref 0.8–4.8)
LYMPHOCYTES NFR BLD AUTO: 16 % (ref 20–44)
MAGNESIUM SERPL-MCNC: 3.1 MG/DL (ref 1.8–2.4)
MCHC RBC AUTO-ENTMCNC: 33 G/DL (ref 31–36)
MCV RBC AUTO: 86 FL (ref 80–96)
MONOCYTES NFR BLD AUTO: 0.5 K/UL (ref 0.1–1.3)
MONOCYTES NFR BLD AUTO: 7.1 % (ref 2–12)
NEUTROPHILS # BLD AUTO: 5.2 K/UL (ref 1.8–8.9)
NEUTROPHILS NFR BLD AUTO: 74 % (ref 43–81)
PCO2 TEMP ADJ BLDA: 48 MMHG (ref 35–45)
PH TEMP ADJ BLDA: 7.53 [PH] (ref 7.35–7.45)
PHOSPHATE SERPL-MCNC: 3.4 MG/DL (ref 2.5–4.9)
PLATELET # BLD AUTO: 302 K/UL (ref 150–450)
PO2 TEMP ADJ BLDA: 72.8 MMHG (ref 75–100)
POTASSIUM SERPL-SCNC: 4.1 MMOL/L (ref 3.5–5.1)
RBC # BLD AUTO: 2.69 MIL/UL (ref 4.5–6)
SAO2 % BLDA: 94.6 % (ref 92–98.5)
SODIUM SERPL-SCNC: 143 MMOL/L (ref 136–145)
VENTILATION MODE VENT: (no result)
WBC NRBC COR # BLD AUTO: 7 K/UL (ref 4.3–11)

## 2023-02-14 RX ADMIN — Medication SCH TAB: at 08:20

## 2023-02-14 RX ADMIN — HEPARIN SODIUM SCH UNITS: 5000 INJECTION INTRAVENOUS; SUBCUTANEOUS at 09:00

## 2023-02-14 RX ADMIN — Medication SCH MG: at 02:24

## 2023-02-14 RX ADMIN — ACETYLCYSTEINE SCH MG: 100 INHALANT RESPIRATORY (INHALATION) at 08:05

## 2023-02-14 RX ADMIN — Medication SCH EACH: at 17:31

## 2023-02-14 RX ADMIN — FUROSEMIDE SCH MG: 40 TABLET ORAL at 09:00

## 2023-02-14 RX ADMIN — INSULIN GLARGINE SCH UNIT: 100 INJECTION, SOLUTION SUBCUTANEOUS at 22:05

## 2023-02-14 RX ADMIN — INSULIN HUMAN PRN UNITS: 100 INJECTION, SOLUTION PARENTERAL at 00:37

## 2023-02-14 RX ADMIN — Medication SCH EACH: at 05:49

## 2023-02-14 RX ADMIN — CHLORHEXIDINE GLUCONATE 0.12% ORAL RINSE SCH ML: 1.2 LIQUID ORAL at 16:34

## 2023-02-14 RX ADMIN — ALBUTEROL SULFATE SCH MG: 2.5 SOLUTION RESPIRATORY (INHALATION) at 20:04

## 2023-02-14 RX ADMIN — HEPARIN SODIUM SCH UNITS: 5000 INJECTION INTRAVENOUS; SUBCUTANEOUS at 22:04

## 2023-02-14 RX ADMIN — SODIUM HYPOCHLORITE SCH ML: 1.25 SOLUTION TOPICAL at 08:28

## 2023-02-14 RX ADMIN — INSULIN HUMAN PRN UNITS: 100 INJECTION, SOLUTION PARENTERAL at 05:56

## 2023-02-14 RX ADMIN — Medication PRN ML: at 18:35

## 2023-02-14 RX ADMIN — INSULIN HUMAN PRN UNITS: 100 INJECTION, SOLUTION PARENTERAL at 17:30

## 2023-02-14 RX ADMIN — Medication SCH MG: at 20:03

## 2023-02-14 RX ADMIN — Medication SCH EACH: at 12:39

## 2023-02-14 RX ADMIN — FERROUS SULFATE TAB 325 MG (65 MG ELEMENTAL FE) SCH MG: 325 (65 FE) TAB at 08:20

## 2023-02-14 RX ADMIN — MUPIROCIN SCH APPLIC: 20 OINTMENT TOPICAL at 08:27

## 2023-02-14 RX ADMIN — ACETYLCYSTEINE SCH MG: 100 INHALANT RESPIRATORY (INHALATION) at 14:55

## 2023-02-14 RX ADMIN — Medication SCH EACH: at 00:33

## 2023-02-14 RX ADMIN — DILTIAZEM HYDROCHLORIDE SCH MG: 30 TABLET, FILM COATED ORAL at 09:00

## 2023-02-14 RX ADMIN — Medication SCH MG: at 08:06

## 2023-02-14 RX ADMIN — AMIODARONE HYDROCHLORIDE SCH MG: 200 TABLET ORAL at 09:00

## 2023-02-14 RX ADMIN — PANTOPRAZOLE SODIUM SCH MG: 40 GRANULE, DELAYED RELEASE ORAL at 08:20

## 2023-02-14 RX ADMIN — MEROPENEM SCH MLS/HR: 1 INJECTION INTRAVENOUS at 12:27

## 2023-02-14 RX ADMIN — TRAZODONE HYDROCHLORIDE SCH MG: 50 TABLET ORAL at 22:01

## 2023-02-14 RX ADMIN — ATORVASTATIN CALCIUM SCH MG: 40 TABLET, FILM COATED ORAL at 22:00

## 2023-02-14 RX ADMIN — ALBUTEROL SULFATE SCH MG: 2.5 SOLUTION RESPIRATORY (INHALATION) at 08:06

## 2023-02-14 RX ADMIN — Medication SCH MG: at 13:41

## 2023-02-14 RX ADMIN — DOCUSATE SODIUM SCH MG: 50 LIQUID ORAL at 08:20

## 2023-02-14 RX ADMIN — MEROPENEM SCH MLS/HR: 1 INJECTION INTRAVENOUS at 22:37

## 2023-02-14 RX ADMIN — CHLORHEXIDINE GLUCONATE 0.12% ORAL RINSE SCH ML: 1.2 LIQUID ORAL at 08:27

## 2023-02-14 RX ADMIN — MEROPENEM SCH MLS/HR: 1 INJECTION INTRAVENOUS at 00:04

## 2023-02-14 RX ADMIN — MUPIROCIN SCH APPLIC: 20 OINTMENT TOPICAL at 22:07

## 2023-02-14 RX ADMIN — NICOTINE SCH MG: 21 PATCH TRANSDERMAL at 08:20

## 2023-02-14 RX ADMIN — INSULIN HUMAN PRN UNITS: 100 INJECTION, SOLUTION PARENTERAL at 12:41

## 2023-02-14 RX ADMIN — ALBUTEROL SULFATE SCH MG: 2.5 SOLUTION RESPIRATORY (INHALATION) at 13:41

## 2023-02-14 RX ADMIN — ASPIRIN 81 MG SCH MG: 81 TABLET ORAL at 08:20

## 2023-02-14 RX ADMIN — DEXTROSE MONOHYDRATE SCH MLS/HR: 50 INJECTION, SOLUTION INTRAVENOUS at 09:55

## 2023-02-14 RX ADMIN — ALBUTEROL SULFATE SCH MG: 2.5 SOLUTION RESPIRATORY (INHALATION) at 02:24

## 2023-02-14 RX ADMIN — OXYCODONE HYDROCHLORIDE AND ACETAMINOPHEN SCH MG: 500 TABLET ORAL at 08:20

## 2023-02-14 NOTE — NUR
TELE RN CLOSING NOTES

PATIENT ON BED AWAKE AND A/O X3-4. WITH TRACH ON COOL AEROSOL, FIO2 35% TOLERATING WELL. NOT 
IN DISTRESS. WITH NO COMPLAINTS OF PAIN AT THIS TIME. ON TELE MONITOR CURRENTLY READING 
SINUS RHYTHM AT 88BPM. ON GLUCERNA FEEDING AT 60ML/HR VIA G-TUBE FEEDING TOLERATING WELL. 
WITH BARCENAS CATHETER IN PLACED DRAINING CLEAR YELLOW URINE VIA GRAVITY. DUE MEDS GIVEN. 
SAFETY MEASURES IN PLACED. CALL LIGHT WITHIN REACH. BED ON LOWEST LOCKED POSITION, SIDE 
RAILS UP X2. WILL ENDORSE TO NEXT SHIFT FOR ISAMAR.

## 2023-02-14 NOTE — NUR
RN OPENING NOTE



PT IS AWAKE, A/O X 3-4, Nicaraguan, NON-VERBAL. ORIENTED TO STAFF AND UNIT. PT IN CPAP 100%, 
PORTEX 7.00, F102 35%, PEEP 5. TOLERATING WELL, BREATHING EVEN,& UNLABORED AT THIS TIME. PT 
IV ACCESS IS AT RLA #22G INTACT, PATENT & FLUSHES WELL. PT EXTERNAL CARDIAC MONITOR WITH 
READING SR HR 81. G-TUBE IN PLACED. BARCENAS CATH IS IN PLACED, DRAINING TO YELLOW COLORED 
URINE. SKIN IS WARM & DRY. SAFETY MEASURES INITIATED, BED PLACED IN LOWEST, LOCKED POSITION, 
SIDERAILS X 4, BEDSIDE TABLE AND CALL LIGHT IN EASY REACH. TO CONTINUE TO MONITOR PT 
ACCORDINGLY.

-------------------------------------------------------------------------------

Addendum: 02/14/23 at 0428 by TONY MACEDO RN

-------------------------------------------------------------------------------

OPENING NOTE DONE 02/13/23 @ 3671

## 2023-02-14 NOTE — NUR
TELE RN OPENING NOTES

RECEIVED PATIENT ON BED AWAKE AND A/O X3-4. WITH TRACH ON CPAP TOLERATING CURRENT SETTINGS. 
NOT IN DISTRESS. WITH NO COMPLAINTS OF PAIN AT THIS TIME. ON TELE MONITOR CURRENTLY READING 
SINUS RHYTHM AT 86BPM. ON GLUCERNA FEEDING AT 40ML/HR VIA G-TUBE FEEDING TOLERATING WELL. 
WITH BARCENAS CATHETER IN PLACED DRAINING CLEAR YELLOW URINE VIA GRAVITY. SAFETY MEASURES IN 
PLACED. CALL LIGHT WITHIN REACH. BED ON LOWEST LOCKED POSITION, SIDE RAILS UP X2. WILL 
CONTINUE TO MONITOR.

## 2023-02-14 NOTE — NUR
RN OPENING NOTE



PATIENT AWAKE IN BED. A/OX3; NON-VERBAL BUT MOUTHS WORDS IN RESPONSE. NO S/S OF DISTRESS, 
BREATHING W/O DIFFICULTY ON COOL AEROSOL. LFA #22 SL INTACT AND PATENT. TELE READS SR 97. 
SAFETY MEASURES IN PLACE: BED LOCKED AND AT LOWEST POSITION, RAILS UP X2, CALL BELL WITHIN 
REACH. WILL CONTINUE TO MONITOR PATIENT.

## 2023-02-14 NOTE — NUR
RN CLOSING NOTE



PT IS DOZING INTERMITTENLY . RESPOSNIVE AND FOLLOWS VERBAL COMMAND. A/O X 3. PT ON CPAP 100% 
PORTEX 7.0, Fi02 35, PEEP 5.  NO S/S OF RESPIRATORY DISTRESS NOTED. ON TELE MONITOR SR HR 
84. IV SITE RLA #22G SL. G-TUBE IN PLACE, PATENT WITH NO RESIDUAL.PT BARCENAS CATH IS IN PLACED 
DRANING YELLOW COLORED URINE. MEDICATION ADMINISTERED ACCORDINGLY AS PER MD'S ORDER. SAFETY 
MEASURES IS MAINTAINED. BED AT ITS LOWEST AND LOCKED POSITION, SIDERAILS X 2, BEDSIDE TABLE 
AND CALL LIGHT IN EASY REACH. TO ENDORSE TO THE NEXT SHIFT FOR CONTINUITY OF CARE.

## 2023-02-14 NOTE — NUR
RN NOTE

CALLED FACILITY AND SPOKE TO OPAL HARDEN. OPAL HARDEN STATED THEY COULD NOT ACCEPT PATIENT ON COOL 
AEROSOL AT 8LPM. THEY WANT HIM TO BE STABLE ON COOL AEROSOL AT 5LPM. WILL INFORM RT. WILL 
MONITOR PATIENT.

## 2023-02-14 NOTE — NUR
RN NOTE

TRANSPORTATION WAS HERE TO PICK-UP PATIENT. PATIENT'S O2 SAT DROPPING TO 80s. DISCHARGE WAS 
HELD. RT SUGGESTED TO PUT THE PATIENT BACK ON VENT BEFORE DISCHARGE. WILL FOLLOW-UP WITH DR. WILDER TOMORROW.

## 2023-02-14 NOTE — NUR
RN CLOSING NOTE

-------------------------------------------------------------------------------

Addendum: 02/14/23 at 0639 alexia MACEDO RN

-------------------------------------------------------------------------------

ERROR

-------------------------------------------------------------------------------

Addendum: 02/14/23 at 0643 alexia MACEDO RN

-------------------------------------------------------------------------------

ERROR

## 2023-02-15 VITALS — DIASTOLIC BLOOD PRESSURE: 67 MMHG | SYSTOLIC BLOOD PRESSURE: 114 MMHG

## 2023-02-15 VITALS — DIASTOLIC BLOOD PRESSURE: 62 MMHG | SYSTOLIC BLOOD PRESSURE: 108 MMHG

## 2023-02-15 LAB
BASOPHILS # BLD AUTO: 0.1 K/UL (ref 0–0.2)
BASOPHILS NFR BLD AUTO: 1.1 % (ref 0–2)
BUN SERPL-MCNC: 85 MG/DL (ref 7–18)
CALCIUM SERPL-MCNC: 10 MG/DL (ref 8.5–10.1)
CHLORIDE SERPL-SCNC: 102 MMOL/L (ref 98–107)
CO2 SERPL-SCNC: 38 MMOL/L (ref 21–32)
CREAT SERPL-MCNC: 1.8 MG/DL (ref 0.6–1.3)
EOSINOPHIL NFR BLD AUTO: 5.7 % (ref 0–6)
GLUCOSE SERPL-MCNC: 148 MG/DL (ref 74–106)
HCT VFR BLD AUTO: 26 % (ref 39–51)
HGB BLD-MCNC: 8.4 G/DL (ref 13.5–17.5)
LYMPHOCYTES NFR BLD AUTO: 1 K/UL (ref 0.8–4.8)
LYMPHOCYTES NFR BLD AUTO: 15.1 % (ref 20–44)
MCHC RBC AUTO-ENTMCNC: 33 G/DL (ref 31–36)
MCV RBC AUTO: 86 FL (ref 80–96)
MONOCYTES NFR BLD AUTO: 0.4 K/UL (ref 0.1–1.3)
MONOCYTES NFR BLD AUTO: 6.5 % (ref 2–12)
NEUTROPHILS # BLD AUTO: 4.7 K/UL (ref 1.8–8.9)
NEUTROPHILS NFR BLD AUTO: 71.6 % (ref 43–81)
PLATELET # BLD AUTO: 319 K/UL (ref 150–450)
POTASSIUM SERPL-SCNC: 3.5 MMOL/L (ref 3.5–5.1)
RBC # BLD AUTO: 2.97 MIL/UL (ref 4.5–6)
SODIUM SERPL-SCNC: 145 MMOL/L (ref 136–145)
WBC NRBC COR # BLD AUTO: 6.6 K/UL (ref 4.3–11)

## 2023-02-15 RX ADMIN — SODIUM HYPOCHLORITE SCH ML: 1.25 SOLUTION TOPICAL at 08:30

## 2023-02-15 RX ADMIN — CHLORHEXIDINE GLUCONATE 0.12% ORAL RINSE SCH ML: 1.2 LIQUID ORAL at 08:21

## 2023-02-15 RX ADMIN — AMIODARONE HYDROCHLORIDE SCH MG: 200 TABLET ORAL at 08:22

## 2023-02-15 RX ADMIN — MUPIROCIN SCH APPLIC: 20 OINTMENT TOPICAL at 08:30

## 2023-02-15 RX ADMIN — FERROUS SULFATE TAB 325 MG (65 MG ELEMENTAL FE) SCH MG: 325 (65 FE) TAB at 08:21

## 2023-02-15 RX ADMIN — Medication SCH EACH: at 18:00

## 2023-02-15 RX ADMIN — CHLORHEXIDINE GLUCONATE 0.12% ORAL RINSE SCH ML: 1.2 LIQUID ORAL at 17:00

## 2023-02-15 RX ADMIN — ACETYLCYSTEINE SCH MG: 100 INHALANT RESPIRATORY (INHALATION) at 15:30

## 2023-02-15 RX ADMIN — OXYCODONE HYDROCHLORIDE AND ACETAMINOPHEN SCH MG: 500 TABLET ORAL at 08:22

## 2023-02-15 RX ADMIN — PANTOPRAZOLE SODIUM SCH MG: 40 GRANULE, DELAYED RELEASE ORAL at 08:22

## 2023-02-15 RX ADMIN — Medication SCH TAB: at 08:21

## 2023-02-15 RX ADMIN — ALBUTEROL SULFATE SCH MG: 2.5 SOLUTION RESPIRATORY (INHALATION) at 07:34

## 2023-02-15 RX ADMIN — Medication SCH MG: at 07:34

## 2023-02-15 RX ADMIN — DOCUSATE SODIUM SCH MG: 50 LIQUID ORAL at 08:19

## 2023-02-15 RX ADMIN — ACETYLCYSTEINE SCH MG: 100 INHALANT RESPIRATORY (INHALATION) at 07:34

## 2023-02-15 RX ADMIN — NICOTINE SCH MG: 21 PATCH TRANSDERMAL at 08:21

## 2023-02-15 RX ADMIN — INSULIN HUMAN PRN UNITS: 100 INJECTION, SOLUTION PARENTERAL at 00:02

## 2023-02-15 RX ADMIN — DILTIAZEM HYDROCHLORIDE SCH MG: 30 TABLET, FILM COATED ORAL at 08:21

## 2023-02-15 RX ADMIN — ALBUTEROL SULFATE SCH MG: 2.5 SOLUTION RESPIRATORY (INHALATION) at 01:17

## 2023-02-15 RX ADMIN — Medication SCH MG: at 13:08

## 2023-02-15 RX ADMIN — Medication SCH EACH: at 11:33

## 2023-02-15 RX ADMIN — ALBUTEROL SULFATE SCH MG: 2.5 SOLUTION RESPIRATORY (INHALATION) at 13:08

## 2023-02-15 RX ADMIN — INSULIN HUMAN PRN UNITS: 100 INJECTION, SOLUTION PARENTERAL at 11:49

## 2023-02-15 RX ADMIN — INSULIN HUMAN PRN UNITS: 100 INJECTION, SOLUTION PARENTERAL at 05:08

## 2023-02-15 RX ADMIN — Medication SCH MG: at 01:17

## 2023-02-15 RX ADMIN — HEPARIN SODIUM SCH UNITS: 5000 INJECTION INTRAVENOUS; SUBCUTANEOUS at 08:51

## 2023-02-15 RX ADMIN — ASPIRIN 81 MG SCH MG: 81 TABLET ORAL at 08:22

## 2023-02-15 RX ADMIN — ACETYLCYSTEINE SCH MG: 100 INHALANT RESPIRATORY (INHALATION) at 01:16

## 2023-02-15 RX ADMIN — Medication SCH EACH: at 00:01

## 2023-02-15 RX ADMIN — SCOPOLAMINE SCH EA: 1 PATCH, EXTENDED RELEASE TRANSDERMAL at 11:37

## 2023-02-15 RX ADMIN — Medication SCH EACH: at 05:33

## 2023-02-15 RX ADMIN — MEROPENEM SCH MLS/HR: 1 INJECTION INTRAVENOUS at 11:09

## 2023-02-15 NOTE — NUR
RN CLOSING NOTE



PATIENT AWAKE IN BED. A/OX3. NO S/S OF DISTRESS, BREATHING WITHOUT DIFFICULTY ON COOL 
AEROSOL. RFA #22 SL INTACT AND PATENT. TELE READS SR 80. GLUCERNA 1.2 @40ML/HR. (PATIENT HAD 
ONE BOUT OF VOMITUS, FEEDING WAS STOPPED, BUT RESUMED AGAIN AT 40ML/HR.) SAFETY MEASURES IN 
PLACE: BED LOCKED AND AT LOWEST POSITION, RAILS UP X2, CALL BELL WITHIN REACH. WILL ENDORSE 
TO DAY SHIFT FOR ISAMAR.

## 2023-02-15 NOTE — NUR
TELE RN DISCHARGE NOTES



PATIENT DISCHARGED TO [facility] IN STABLE CONDITION. A/O X3. Georgian SPEAKING BUT 
ABLE TO MAKE THINGS KNOWN THRU DAUGHTER AS . 



PATIENT'S BELONGINGS WERE COMPLETE--ALL ACCOUNTED FOR. IV ACCESS REMOVED-CDI, BARCENAS CATHETER 
REMOVED AS WELL AS REQUESTED BY THE FAMILY, PATIENT VOIDED. PATIENT NOT IN DISTRESS. WITH NO 
COMPLAINTS OF PAIN AT THIS TIME. 



TELE MONITOR READING SINUS RHYTHM AT 89BPM, PRIOR TO REMOVAL AND RETURNED TO TELEMETRY 
STAFF. PATIENT WAS GIVEN DISCHARGE INSTRUCTIONS THRU DAUGHTER AS -VERBALLY AND IN 
WRITTEN FORM. PATIENT LEFT IN STABLE CONDITION VIA [facility] WITH TWO EMTs & PATIENT'S WIFE. 
CHARGE NURSE AWARE OF THE DISCHARGE.

## 2023-02-15 NOTE — NUR
RN OPENING NOTE



PATIENT AWAKE IN BED. A/OX3; NO S/S OF DISTRESS, BREATHING W/O DIFFICULTY ON COOL AEROSOL. 
LFA #22 SL INTACT AND PATENT. TELE READS SR 98. SAFETY MEASURES IN PLACE: BED LOCKED AND AT 
LOWEST POSITION, RAILS UP X2, CALL BELL WITHIN REACH. WILL CONTINUE TO MONITOR PATIENT.
